# Patient Record
Sex: FEMALE | Race: WHITE | NOT HISPANIC OR LATINO | Employment: OTHER | URBAN - METROPOLITAN AREA
[De-identification: names, ages, dates, MRNs, and addresses within clinical notes are randomized per-mention and may not be internally consistent; named-entity substitution may affect disease eponyms.]

---

## 2017-03-06 LAB
EXT ALBUMIN: 4.2
EXT ALKALINE PHOSPHATASE: 86
EXT ALT: 14
EXT AST: 24
EXT BILIRUBIN TOTAL: 0.6
EXT BUN: 21
EXT CALCIUM: 9.5
EXT CHLORIDE: 101
EXT CHOLESTEROL: 157
EXT CO2: 25
EXT CREATININE: 1 MG/DL
EXT EOSINOPHIL%: 1.6
EXT FERRITIN: 226
EXT FOLATE: 9.04
EXT GFR MDRD NON AF AMER: 57.2
EXT GGT: 60
EXT GLUCOSE: 110
EXT HCV QUANT: ABNORMAL
EXT HDL: 42
EXT HEMATOCRIT: 37.1
EXT HEMOGLOBIN: 12.2
EXT LYMPH%: 41.9
EXT MONOCYTES%: 8.3
EXT PLATELETS: 211
EXT POTASSIUM: 4
EXT PROTEIN TOTAL: 7.6
EXT RETICULOCYTE COUNT: 1.4
EXT SEGS%: 47.6
EXT SIROLIMUS LVL: 6.1
EXT SODIUM: 141 MMOL/L
EXT TIBC: 243
EXT TRIGLYCERIDES: 256
EXT UNSATURATED IRON BINDING CAP.: 151
EXT WBC: 5
IRON SERPL-MCNC: 92 UG/DL
LDH, TOTAL: 179
TSH SERPL DL<=0.005 MIU/L-ACNC: 3.38 UIU/ML (ref 0.41–5.9)
VITAMIN B12: 423

## 2017-03-09 ENCOUNTER — TELEPHONE (OUTPATIENT)
Dept: TRANSPLANT | Facility: CLINIC | Age: 72
End: 2017-03-09

## 2017-03-09 NOTE — TELEPHONE ENCOUNTER
----- Message from Melanie Gifford MD sent at 3/7/2017 12:43 PM CST -----  The Labs are stable - please let patient know.

## 2017-03-10 ENCOUNTER — TELEPHONE (OUTPATIENT)
Dept: TRANSPLANT | Facility: CLINIC | Age: 72
End: 2017-03-10

## 2017-03-10 NOTE — TELEPHONE ENCOUNTER
----- Message from Melanie Gifford MD sent at 3/10/2017 11:56 AM CST -----  The Labs are stable - please let patient know.

## 2017-03-13 RX ORDER — SIROLIMUS 1 MG/1
1 TABLET, FILM COATED ORAL DAILY
Qty: 90 TABLET | Refills: 3 | Status: SHIPPED | OUTPATIENT
Start: 2017-03-13 | End: 2017-06-05 | Stop reason: SDUPTHER

## 2017-06-05 ENCOUNTER — TELEPHONE (OUTPATIENT)
Dept: TRANSPLANT | Facility: CLINIC | Age: 72
End: 2017-06-05

## 2017-06-05 LAB
EXT ALBUMIN: 3.6
EXT ALKALINE PHOSPHATASE: 80
EXT ALT: 11
EXT AST: 19
EXT BILIRUBIN TOTAL: 0.4
EXT BUN: 16
EXT CALCIUM: 9.5
EXT CHLORIDE: 103
EXT CHOLESTEROL: 148
EXT CO2: 28
EXT CREATININE: 0.8 MG/DL
EXT EOSINOPHIL%: 5.2
EXT GFR MDRD NON AF AMER: 70
EXT GGT: 49
EXT GLUCOSE: 116
EXT HDL: 32
EXT HEMATOCRIT: 35.2
EXT HEMOGLOBIN: 11.4
EXT LDL CHOLESTEROL: 54
EXT LYMPH%: 37.5
EXT MONOCYTES%: 10.8
EXT PLATELETS: 202
EXT POTASSIUM: 4
EXT PROTEIN TOTAL: 6.9
EXT SEGS%: 45.7
EXT SIROLIMUS LVL: 7.1
EXT SODIUM: 144 MMOL/L
EXT TRIGLYCERIDES: 308
EXT WBC: 5.4

## 2017-06-05 RX ORDER — SIROLIMUS 1 MG/1
1 TABLET, FILM COATED ORAL DAILY
Qty: 90 TABLET | Refills: 3 | Status: SHIPPED | OUTPATIENT
Start: 2017-06-05 | End: 2018-03-05 | Stop reason: SDUPTHER

## 2017-06-05 NOTE — TELEPHONE ENCOUNTER
Patient notified and instructed that refill request has been sent to NP for review and signature and to be electronically sent to her pharmacy. She was able to voice understanding.

## 2017-06-05 NOTE — TELEPHONE ENCOUNTER
----- Message from Megan Ag NP sent at 6/5/2017  2:24 PM CDT -----  Rapamune refill complete  Patient needs annual f/u scheduled last seen 7/2016    thx

## 2017-06-05 NOTE — TELEPHONE ENCOUNTER
----- Message from Amelia Hsieh sent at 6/5/2017 12:41 PM CDT -----  Contact: patient   Calling to get a refill on her sirolimus (RAPAMUNE) 1 MG Tab called into [facility] [place]. Please call   [phone]

## 2017-06-08 ENCOUNTER — TELEPHONE (OUTPATIENT)
Dept: TRANSPLANT | Facility: CLINIC | Age: 72
End: 2017-06-08

## 2017-06-08 NOTE — TELEPHONE ENCOUNTER
----- Message from Melanie Gifford MD sent at 6/8/2017 12:34 PM CDT -----  The Labs are stable - please let patient know.

## 2017-09-22 ENCOUNTER — TELEPHONE (OUTPATIENT)
Dept: TRANSPLANT | Facility: CLINIC | Age: 72
End: 2017-09-22

## 2017-09-22 NOTE — TELEPHONE ENCOUNTER
Labs were due on 9/11/17; her local lab reported she did not go.  Missed Lab Letter was sent to patient.

## 2017-10-02 LAB
EXT ALBUMIN: 4.2
EXT ALKALINE PHOSPHATASE: 80
EXT ALT: 13
EXT AST: 22
EXT BILIRUBIN TOTAL: 0.5
EXT BUN: 13
EXT CALCIUM: 9.2
EXT CHLORIDE: 103
EXT CHOLESTEROL: 145
EXT CO2: 26
EXT CREATININE: 0.9 MG/DL
EXT EOSINOPHIL%: 2.7
EXT GFR MDRD NON AF AMER: 66
EXT GGT: 50
EXT GLUCOSE: 102
EXT HDL: 41
EXT HEMATOCRIT: 36
EXT HEMOGLOBIN: 12
EXT LDL CHOLESTEROL: 72
EXT LYMPH%: 37.7
EXT MONOCYTES%: 9.9
EXT PLATELETS: 191
EXT POTASSIUM: 4.2
EXT PROTEIN TOTAL: 7.6
EXT SEGS%: 48.9
EXT SIROLIMUS LVL: 4.3
EXT SODIUM: 143 MMOL/L
EXT TRIGLYCERIDES: 161
EXT WBC: 5

## 2017-10-09 ENCOUNTER — TELEPHONE (OUTPATIENT)
Dept: TRANSPLANT | Facility: CLINIC | Age: 72
End: 2017-10-09

## 2017-10-09 NOTE — TELEPHONE ENCOUNTER
----- Message from Melanie Gifford MD sent at 10/8/2017  8:55 PM CDT -----  The Labs are stable - please let patient know.

## 2018-01-15 LAB
EXT ALBUMIN: 4.5
EXT ALKALINE PHOSPHATASE: 93
EXT ALT: 16
EXT AST: 26
EXT BILIRUBIN TOTAL: 0.6
EXT BUN: 16
EXT CALCIUM: 9.5
EXT CHLORIDE: 106
EXT CHOLESTEROL: 165
EXT CO2: 30
EXT CREATININE: 0.7 MG/DL
EXT EOSINOPHIL%: 1.5
EXT GFR MDRD NON AF AMER: 84
EXT GGT: 60
EXT GLUCOSE: 101
EXT HDL: 45
EXT HEMATOCRIT: 40
EXT HEMOGLOBIN: 13
EXT LDL CHOLESTEROL: 77.4
EXT LYMPH%: 35.1
EXT MONOCYTES%: 9.7
EXT PLATELETS: 225
EXT POTASSIUM: 4.3
EXT PROTEIN TOTAL: 7.9
EXT SEGS%: 52.7
EXT SIROLIMUS LVL: 4.8
EXT SODIUM: 145 MMOL/L
EXT TRIGLYCERIDES: 213
EXT WBC: 5.2

## 2018-01-25 ENCOUNTER — TELEPHONE (OUTPATIENT)
Dept: TRANSPLANT | Facility: CLINIC | Age: 73
End: 2018-01-25

## 2018-01-25 NOTE — TELEPHONE ENCOUNTER
----- Message from Melanie Gifford MD sent at 1/19/2018 10:06 PM CST -----  The Labs are stable - please let patient know.

## 2018-02-16 ENCOUNTER — TELEPHONE (OUTPATIENT)
Dept: TRANSPLANT | Facility: CLINIC | Age: 73
End: 2018-02-16

## 2018-02-16 NOTE — TELEPHONE ENCOUNTER
----- Message from Phyllis Schmitt sent at 2/16/2018 11:19 AM CST -----  Contact: Pt  Would like a call regarding medication she was prescribed for her cold.     She would like to know if she is able to take it?     call [phone]

## 2018-02-16 NOTE — TELEPHONE ENCOUNTER
Pt called about taking naproxen for back pain advised she should not take that as it is an Nsaid. Advised it was ok to take Robaxin that was given as well. She also has upper resp infection as was given a week of steriods    Advised ok to take for the week

## 2018-03-05 RX ORDER — SIROLIMUS 1 MG/1
1 TABLET, FILM COATED ORAL DAILY
Qty: 90 TABLET | Refills: 3 | Status: SHIPPED | OUTPATIENT
Start: 2018-03-05 | End: 2018-11-19 | Stop reason: SDUPTHER

## 2018-04-20 ENCOUNTER — TELEPHONE (OUTPATIENT)
Dept: TRANSPLANT | Facility: CLINIC | Age: 73
End: 2018-04-20

## 2018-04-20 NOTE — TELEPHONE ENCOUNTER
Labs were due on 4/16/18; informed by  that patient did not go. Missed Lab Letter sent to patient.

## 2018-04-24 LAB
EXT ALBUMIN: 4.2
EXT ALKALINE PHOSPHATASE: 73
EXT ALT: 10
EXT AST: 16
EXT BILIRUBIN TOTAL: 0.6
EXT BUN: 23
EXT CALCIUM: 10
EXT CHLORIDE: 101
EXT CO2: 29
EXT CREATININE: 0.8 MG/DL
EXT EOSINOPHIL%: 1.5
EXT GFR MDRD NON AF AMER: 72
EXT GGT: 24
EXT GLUCOSE: 88
EXT HEMATOCRIT: 38.3
EXT HEMOGLOBIN: 12.1
EXT LYMPH%: 38.4
EXT MONOCYTES%: 8.1
EXT PLATELETS: 239
EXT POTASSIUM: 4.1
EXT PROTEIN TOTAL: 8
EXT RAPAMUNE LEVEL: 4.7
EXT SEGS%: 51.2
EXT SODIUM: 142 MMOL/L
EXT WBC: 5.2

## 2018-05-02 ENCOUNTER — TELEPHONE (OUTPATIENT)
Dept: TRANSPLANT | Facility: CLINIC | Age: 73
End: 2018-05-02

## 2018-05-02 NOTE — TELEPHONE ENCOUNTER
----- Message from Melanie Gifford MD sent at 4/28/2018  1:16 PM CDT -----  The Labs are stable - please let patient know.

## 2018-07-18 ENCOUNTER — TELEPHONE (OUTPATIENT)
Dept: TRANSPLANT | Facility: CLINIC | Age: 73
End: 2018-07-18

## 2018-07-18 NOTE — TELEPHONE ENCOUNTER
----- Message from Dona Catalan sent at 7/18/2018  3:07 PM CDT -----  Done.  I cx'd her appt per her msg request     ----- Message -----  From: Angela Randall  Sent: 7/18/2018  11:32 AM  To: [facility] Post-Liver Transplant Non-Clinical    Patient Requesting Sooner Appointment.     Reason for sooner appt.:  When is the first available appointment?  Communication Preference:  Additional Information: wants to cancel appt and she will call back to resched

## 2018-08-14 ENCOUNTER — TELEPHONE (OUTPATIENT)
Dept: TRANSPLANT | Facility: CLINIC | Age: 73
End: 2018-08-14

## 2018-08-14 LAB
EXT ALBUMIN: 4.3
EXT ALKALINE PHOSPHATASE: 75
EXT ALT: 14
EXT AST: 18
EXT BILIRUBIN TOTAL: 0.5
EXT BUN: 18
EXT CALCIUM: 9.8
EXT CHLORIDE: 104
EXT CHOLESTEROL: 182
EXT CO2: 27
EXT CREATININE: 0.88 MG/DL
EXT EOSINOPHIL%: 1.5
EXT GFR MDRD NON AF AMER: 63
EXT GGT: 35
EXT GLUCOSE: 97
EXT HDL: 50
EXT HEMATOCRIT: 38.3
EXT HEMOGLOBIN: 12.2
EXT LDL CHOLESTEROL: 103
EXT LYMPH%: 36.3
EXT MONOCYTES%: 10.3
EXT PLATELETS: 218
EXT POTASSIUM: 4.7
EXT PROTEIN TOTAL: 7.7
EXT SEGS%: 51.3
EXT SIROLIMUS LVL: 5.1
EXT SODIUM: 144 MMOL/L
EXT TRIGLYCERIDES: 145
EXT WBC: 4.7

## 2018-08-14 NOTE — TELEPHONE ENCOUNTER
Labs due on 7/30/18.  Unable to get results from local lab as requested.  Missed Lab Letter sent to patient.

## 2018-08-20 ENCOUNTER — TELEPHONE (OUTPATIENT)
Dept: TRANSPLANT | Facility: CLINIC | Age: 73
End: 2018-08-20

## 2018-08-20 NOTE — TELEPHONE ENCOUNTER
----- Message from Dona Catalan sent at 8/20/2018  3:52 PM CDT -----  Contact: patient  I left her a voicemail to call me back    ----- Message -----  From: Nellie Rai  Sent: 8/20/2018   3:14 PM  To: [facility] Post-Liver Transplant Non-Clinical    Patient Requesting Appointment.     Reason for appt.: schedule annual appoinment  When is the first available appointment? n/a  Communication Preference: [phone]  Additional Information: n/a

## 2018-08-20 NOTE — TELEPHONE ENCOUNTER
----- Message from Melanie Gifford MD sent at 8/19/2018 10:16 AM CDT -----  The Labs are stable - please let patient know.

## 2018-08-23 ENCOUNTER — TELEPHONE (OUTPATIENT)
Dept: GASTROENTEROLOGY | Facility: CLINIC | Age: 73
End: 2018-08-23

## 2018-08-23 NOTE — TELEPHONE ENCOUNTER
Spoke with patient. Asked her to have  fax over her records, she verbalized understanding, JOSLYN.

## 2018-08-23 NOTE — TELEPHONE ENCOUNTER
----- Message from Abdi Zapien sent at 8/23/2018 12:12 PM CDT -----  Contact: pt   Pt is requesting a call back from the nurse in regards to getting in to see the Dr because Dr Bryan in [place]  wants her to get in to see Dr. Lopes. I don't have any slots to put the pt in  [phone]

## 2018-10-09 ENCOUNTER — TELEPHONE (OUTPATIENT)
Dept: TRANSPLANT | Facility: CLINIC | Age: 73
End: 2018-10-09

## 2018-10-09 NOTE — TELEPHONE ENCOUNTER
----- Message from Dona Catalan sent at 10/9/2018  1:27 PM CDT -----  Contact: Patient   Done, I spoke to Mrs. Bates and rescheduled her appt to 11/19/18    ----- Message -----  From: June Aguilar  Sent: 10/9/2018   1:08 PM  To: [facility] Post-Liver Transplant Non-Clinical    Needs Advice    Reason for call: pt called to reschedule 10/8 appt. Pt prefer a Monday noon, if possible.        Communication Preference: [phone]    Additional Information: This is patient 3rd attempt calling to schedule appt.

## 2018-11-19 ENCOUNTER — OFFICE VISIT (OUTPATIENT)
Dept: TRANSPLANT | Facility: CLINIC | Age: 73
End: 2018-11-19
Payer: MEDICARE

## 2018-11-19 VITALS
HEART RATE: 60 BPM | OXYGEN SATURATION: 100 % | SYSTOLIC BLOOD PRESSURE: 115 MMHG | WEIGHT: 158.94 LBS | TEMPERATURE: 98 F | RESPIRATION RATE: 16 BRPM | DIASTOLIC BLOOD PRESSURE: 91 MMHG | HEIGHT: 66 IN | BODY MASS INDEX: 25.54 KG/M2

## 2018-11-19 DIAGNOSIS — I10 ESSENTIAL HYPERTENSION: ICD-10-CM

## 2018-11-19 DIAGNOSIS — Z29.89 NEED FOR PROPHYLACTIC IMMUNOTHERAPY: ICD-10-CM

## 2018-11-19 DIAGNOSIS — Z94.4 STATUS POST LIVER TRANSPLANT: Primary | ICD-10-CM

## 2018-11-19 DIAGNOSIS — M85.80 OSTEOPENIA, UNSPECIFIED LOCATION: ICD-10-CM

## 2018-11-19 DIAGNOSIS — Z85.828 HISTORY OF SKIN CANCER: ICD-10-CM

## 2018-11-19 PROCEDURE — 99214 OFFICE O/P EST MOD 30 MIN: CPT | Mod: PBBFAC | Performed by: NURSE PRACTITIONER

## 2018-11-19 PROCEDURE — 99215 OFFICE O/P EST HI 40 MIN: CPT | Mod: S$PBB,,, | Performed by: NURSE PRACTITIONER

## 2018-11-19 PROCEDURE — 99999 PR PBB SHADOW E&M-EST. PATIENT-LVL IV: CPT | Mod: PBBFAC,,, | Performed by: NURSE PRACTITIONER

## 2018-11-19 RX ORDER — SIROLIMUS 1 MG/1
1 TABLET, SUGAR COATED ORAL DAILY
Qty: 90 TABLET | Refills: 3 | Status: SHIPPED | OUTPATIENT
Start: 2018-11-19 | End: 2018-12-03 | Stop reason: SDUPTHER

## 2018-11-19 NOTE — PATIENT INSTRUCTIONS
1. Continue your labs every 3 months, follow up in clinic at least yearly   2. Continue dermatology follow up for your skin exam   3. Contact previous vascular doctor to inquire about diagnosis and notify coordinator so can schedule appt in vascular surgery/medicine   4. If you have any questions, call the [facility] mail order pharmacy [phone]  5. Repeat your bone scan (DEXA) scan at your primary care office     EDUCATION:  -- You have an increased risk of infection (bacterial, viral, or fungal infections) because you are on immunosuppression medications  -- schedule colonoscopy, and/or well women screenings as recommended by your primary care provider   -- Higher chance of high blood pressure, diabetes, high cholesterol and weight gain post transplant, so recommend to follow closely with your primary doctor to monitor for these conditions or worsening of these conditions. At higher risk for diabetes with rejection medications post transplant so should be screened for diabetes at least yearly by your primary provider  -- increased risk of cancer, including skin cancers. Avoid sun, wears hat when outside, use sunscreen with at least 15 spf at all times when outside, schedule yearly dermatology skin checks (at higher risk for skin cancer post transplant)  -- increased risk of kidney disease with the use of immunosuppression medications  -- DXA (bone) scan to assess for bone thinning (osteoporosis), next due now

## 2018-11-19 NOTE — PROGRESS NOTES
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    ORGAN: LIVER  Whole or Partial: whole liver  Donor Type:   CDC High Risk:   Donor CMV Status: Positive  Donor HCV Status:   Donor HBcAb:   Donor HBV ANTHONY:  Donor HCV ANTHONY:   Biliary Anastomosis:   Arterial Anatomy:   IVC reconstruction:   Portal vein status:     She has had the following complications since transplant: h/o skin cancer. The noted complications are well controlled.    Subjective:     Interval History: Sheri Leon was last seen on 7/2016 by NARGIS Corbin NP.  Needs f/u visit with Dr. Gifford.    Currently, she is doing well. Current complaints include none.    Hepatologist: Dr. Gifford    Current Immunosuppression: currently taking Rapamune 1 mg daily, level 5.1 8/2018     Liver allograft function: excellent      8/13/2018 00:00   EXT Albumin 4.3   EXT Alkaline Phosphatase 75   EXT ALT 14   EXT AST 18   EXT BilirubiN Total 0.5   EXT Creatinine 0.88   EXT Glucose 97   EXT Platelets 218   EXT Potassium 4.7   EXT Sirolimus Lvl 5.1   EXT Sodium 144   EXT WBC 4.7       Kidney function : WNL    No recurrent infections, any hospitalizations since last visit   Skin cancer followed by local derm, last excision per pt report early 2018, dermatology notified pt "not worrisome"  HTN - well controlled     Last Fibroscan : none, will offer today     HEALTH MAINTENANCE:  1. Last DXA scan: osteopenia in past, needs repeat   2. Last dermatology skin assessment - followed by local derm   3. A1c - per PCP   4. Mammogram - overdue, pt to schedule     Review of Systems   Constitutional: Negative for activity change, appetite change, chills, fatigue, fever and unexpected weight change.   HENT: Negative.    Eyes: Negative.    Respiratory: Negative for cough and shortness of breath.    Cardiovascular: Negative for " chest pain and leg swelling.   Gastrointestinal: Negative for abdominal distention, abdominal pain, constipation, diarrhea, nausea and vomiting.   Endocrine: Negative.    Genitourinary: Negative for dysuria, flank pain and urgency.   Musculoskeletal: Negative.    Skin: Negative for rash and wound.   Allergic/Immunologic: Positive for immunocompromised state.   Neurological: Negative for dizziness, weakness and headaches.   Hematological: Negative for adenopathy. Does not bruise/bleed easily.   Psychiatric/Behavioral: Negative.        Objective:     Physical Exam   Constitutional: She is oriented to person, place, and time. She appears well-developed and well-nourished.   HENT:   Head: Normocephalic and atraumatic.   Eyes: EOM are normal. Pupils are equal, round, and reactive to light. No scleral icterus.   Neck: Normal range of motion. Neck supple.   Cardiovascular: Normal rate, regular rhythm and normal heart sounds.   No murmur heard.  Pulmonary/Chest: Effort normal and breath sounds normal. She has no wheezes. She has no rales.   Abdominal: Soft. Bowel sounds are normal. She exhibits no distension and no mass. There is no tenderness. There is no rebound and no guarding.   Musculoskeletal: Normal range of motion. She exhibits no edema or tenderness.   Lymphadenopathy:     She has no cervical adenopathy.   Neurological: She is alert and oriented to person, place, and time.   Skin: Skin is warm and dry. Capillary refill takes less than 2 seconds. No rash noted. No erythema.   Psychiatric: She has a normal mood and affect. Her behavior is normal.     Lab Results   Component Value Date    BILITOT 0.9 08/04/2014    AST 26 08/04/2014    ALT 18 08/04/2014    ALKPHOS 90 08/04/2014    CREATININE 1.0 08/04/2014    ALBUMIN 3.8 08/04/2014     Lab Results   Component Value Date    WBC 4.62 08/04/2014    HGB 12.7 08/04/2014    HCT 39.6 08/04/2014     08/04/2014     Lab Results   Component Value Date    SIROLIMUSLEV 6.6  08/04/2014       Assessment/Plan:     1. Status post liver transplant    2. Need for prophylactic immunotherapy    3. Osteopenia, unspecified location    4. Essential hypertension    5. History of skin cancer        1. S/p liver transplant 1993, cryptogenic     2. Immunosuppression - currently taking rapamune 1 mg daily, last level 5.1 8/2018  --- immunosuppression stable, kidney function stable    3. Health maintenance  -- yearly dermatology visit - UTD with local provider   -- DXA scan : due now, pt to organize with local PCP  -- Diabetes screening with A1c per PCP  -- Fibroscan yearly to offer today      4. HTN  -- controlled     5. Osteopenia  -- needs repeat DEXA, pt to arrange with local PCP    6. H/o skin cancer  -- pt followed closely by derm locally     7. Pt report of vascular abnormality in legs (?)  -- pt unsure of specifics of diagnosis, wishes for 2nd opinion, offered vascular consult at [facility]. Pt will obtain details of diagnosis and call to schedule appt       PLAN:  1. RTC in 1 year with Dr. Gifford, labs per protocol   2. Fibroscan yearly starting next year (not fasting today)  3. Pt inquiring about vascular consult for 2nd opinion. Pt unsure of details of diagnosis, reports she was diagnosed with some vascular abnormalities in her BLE but unsure of details. Instructed pt to obtain detail of diagnosis and notify coordinator of details so that appropriate provider in vascular can be scheduled   4. Continue f/u with local dermatology   5. needs repeat DEXA, pt to arrange with local PCP  6. [facility] [phone] with any questions about Rapamune. Pt will start filling med with [facility] mail order pharmacy, rx sent     Note routed to post-liver transplant coordinator pool, Dr. Ирина Doe, EVERTON CHAVEZOS Patient Status  Functional Status: 100% - Normal, no complaints, no evidence of disease  Physical Capacity: No Limitations

## 2018-11-19 NOTE — LETTER
November 19, 2018        Donovan Pabon Jr.  [address]  [place] [address]  Phone: [phone]  Fax: [phone]             [facility]  [address]  [facility] [address]  Phone: [phone]   Patient: Sheri Bates   MR Number: [id number]   YOB: 1945   Date of Visit: 11/19/2018       Dear Dr. Donovan Pabon Jr.    Thank you for referring Sheri Bates to me for evaluation. Attached you will find relevant portions of my assessment and plan of care.    If you have questions, please do not hesitate to call me. I look forward to following Sheri Bates along with you.    Sincerely,    Ivonne Doe NP    Enclosure    If you would like to receive this communication electronically, please contact [email or web address] or [phone] to request [employer] access.    [employer] is a tool which provides read-only access to select patient information with whom you have a relationship. Its easy to use and provides real time access to review your patients record including encounter summaries, notes, results, and demographic information.    If you feel you have received this communication in error or would no longer like to receive these types of communications, please e-mail [email or web address]

## 2018-11-30 ENCOUNTER — OFFICE VISIT (OUTPATIENT)
Dept: GASTROENTEROLOGY | Facility: CLINIC | Age: 73
End: 2018-11-30
Payer: MEDICARE

## 2018-11-30 VITALS
DIASTOLIC BLOOD PRESSURE: 80 MMHG | HEART RATE: 60 BPM | HEIGHT: 66 IN | SYSTOLIC BLOOD PRESSURE: 140 MMHG | BODY MASS INDEX: 25.33 KG/M2 | WEIGHT: 157.63 LBS

## 2018-11-30 DIAGNOSIS — Z94.4 LIVER TRANSPLANTED: ICD-10-CM

## 2018-11-30 DIAGNOSIS — D84.9 IMMUNOSUPPRESSION: Primary | ICD-10-CM

## 2018-11-30 PROCEDURE — 99213 OFFICE O/P EST LOW 20 MIN: CPT | Mod: S$PBB,,, | Performed by: INTERNAL MEDICINE

## 2018-11-30 PROCEDURE — 99999 PR PBB SHADOW E&M-EST. PATIENT-LVL III: CPT | Mod: PBBFAC,,, | Performed by: INTERNAL MEDICINE

## 2018-11-30 PROCEDURE — 99213 OFFICE O/P EST LOW 20 MIN: CPT | Mod: PBBFAC,PO | Performed by: INTERNAL MEDICINE

## 2018-11-30 NOTE — PROGRESS NOTES
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    ORGAN: LIVER  Whole or Partial: whole liver      Subjective:     Interval History:  Currently, she is doing well. Current complaints include chronic issues which she believes is lower extremity edema that has been present for many years.  She believes related to since after her transplant.  Otherwise she wishes seen in transplant clinic about 2 weeks ago.  She was unaware that this visit was also liver related.  She does follow up with her doctors regularly including a cardiologist as well as dermatologist locally.  She has not gotten a mammogram done and expresses that she is not interested in following up with her mammogram.    She has not had any issues with her liver since transplant.    She believes she had non a non-B hepatitis although she did have hepatitis-C quant checked in 2017 which was nonreactive.  She denies any treatment for hepatitis C in the past.    Review of Systems    Objective:     Physical Exam   Constitutional: She is oriented to person, place, and time. She appears well-developed and well-nourished. No distress.   HENT:   Head: Normocephalic and atraumatic.   Mouth/Throat: Oropharynx is clear and moist. No oropharyngeal exudate.   Eyes: Conjunctivae are normal. Pupils are equal, round, and reactive to light. Right eye exhibits no discharge. Left eye exhibits no discharge. No scleral icterus.   Pulmonary/Chest: Effort normal and breath sounds normal. No respiratory distress. She has no wheezes.   Abdominal: Soft. She exhibits no distension. There is no tenderness.   Musculoskeletal: She exhibits no edema.   Neurological: She is alert and oriented to person, place, and time.   Psychiatric: She has a normal mood and affect. Her behavior is normal.   Vitals reviewed.      WBC   Date  Value Ref Range Status   08/04/2014 4.62 3.90 - 12.70 K/uL Final     Hemoglobin   Date Value Ref Range Status   08/04/2014 12.7 12.0 - 16.0 g/dL Final     Hematocrit   Date Value Ref Range Status   08/04/2014 39.6 37.0 - 48.5 % Final     Platelets   Date Value Ref Range Status   08/04/2014 217 150 - 350 K/uL Final     BUN, Bld   Date Value Ref Range Status   08/04/2014 14 8 - 23 mg/dL Final     Creatinine   Date Value Ref Range Status   08/04/2014 1.0 0.5 - 1.4 mg/dL Final     Glucose   Date Value Ref Range Status   08/04/2014 96 70 - 110 mg/dL Final     Calcium   Date Value Ref Range Status   08/04/2014 9.2 8.7 - 10.5 mg/dL Final     Sodium   Date Value Ref Range Status   08/04/2014 143 136 - 145 mmol/L Final     Potassium   Date Value Ref Range Status   08/04/2014 3.5 3.5 - 5.1 mmol/L Final     Chloride   Date Value Ref Range Status   08/04/2014 108 95 - 110 mmol/L Final     AST   Date Value Ref Range Status   08/04/2014 26 10 - 40 U/L Final     ALT   Date Value Ref Range Status   08/04/2014 18 10 - 44 U/L Final     Alkaline Phosphatase   Date Value Ref Range Status   08/04/2014 90 55 - 135 U/L Final     Total Bilirubin   Date Value Ref Range Status   08/04/2014 0.9 0.1 - 1.0 mg/dL Final     Comment:     For infants and newborns, interpretation of results should be based  on gestational age, weight and in agreement with clinical  observations.  Premature Infant recommended reference ranges:  Up to 24 hours.............<8.0 mg/dL  Up to 48 hours............<12.0 mg/dL  3-5 days..................<15.0 mg/dL  6-29 days.................<15.0 mg/dL       Albumin   Date Value Ref Range Status   08/04/2014 3.8 3.5 - 5.2 g/dL Final     No results found for: INR  Lab Results   Component Value Date    SIROLIMUSLEV 6.6 08/04/2014           Assessment/Plan:     1. Immunosuppression    2. Liver transplanted      Immunosuppression  -continue current immunosuppression    Liver transplanted-good allograft function  -at next visit  will consider fibroscan, although little concern for recurrent liver disease  -continue with routine labs, add on HCV Ab and quant  -Recommend she f/u with cards or PCP about her cholesterol    RTC in 1 year at [facility]    Patient was seen in the liver transplant department at [facility].    Angela Lopes MD         [facility] Patient Status  Functional Status: 80% - Normal activity with effort: some symptoms of disease  Physical Capacity: No Limitations

## 2018-12-02 ENCOUNTER — PATIENT MESSAGE (OUTPATIENT)
Dept: TRANSPLANT | Facility: CLINIC | Age: 73
End: 2018-12-02

## 2018-12-03 DIAGNOSIS — Z94.4 STATUS POST LIVER TRANSPLANT: ICD-10-CM

## 2018-12-05 RX ORDER — SIROLIMUS 1 MG/1
1 TABLET, SUGAR COATED ORAL DAILY
Qty: 90 TABLET | Refills: 3 | Status: SHIPPED | OUTPATIENT
Start: 2018-12-05 | End: 2018-12-14 | Stop reason: SDUPTHER

## 2018-12-06 ENCOUNTER — PATIENT MESSAGE (OUTPATIENT)
Dept: TRANSPLANT | Facility: CLINIC | Age: 73
End: 2018-12-06

## 2018-12-07 ENCOUNTER — PATIENT MESSAGE (OUTPATIENT)
Dept: GASTROENTEROLOGY | Facility: CLINIC | Age: 73
End: 2018-12-07

## 2018-12-10 ENCOUNTER — PATIENT MESSAGE (OUTPATIENT)
Dept: GASTROENTEROLOGY | Facility: CLINIC | Age: 73
End: 2018-12-10

## 2018-12-13 ENCOUNTER — PATIENT MESSAGE (OUTPATIENT)
Dept: GASTROENTEROLOGY | Facility: CLINIC | Age: 73
End: 2018-12-13

## 2018-12-14 ENCOUNTER — PATIENT MESSAGE (OUTPATIENT)
Dept: GASTROENTEROLOGY | Facility: CLINIC | Age: 73
End: 2018-12-14

## 2018-12-14 DIAGNOSIS — Z94.4 STATUS POST LIVER TRANSPLANT: ICD-10-CM

## 2018-12-17 RX ORDER — SIROLIMUS 1 MG/1
1 TABLET, SUGAR COATED ORAL DAILY
Qty: 90 TABLET | Refills: 3 | Status: SHIPPED | OUTPATIENT
Start: 2018-12-17 | End: 2019-01-03 | Stop reason: SDUPTHER

## 2018-12-18 ENCOUNTER — PATIENT MESSAGE (OUTPATIENT)
Dept: TRANSPLANT | Facility: CLINIC | Age: 73
End: 2018-12-18

## 2018-12-18 ENCOUNTER — TELEPHONE (OUTPATIENT)
Dept: TRANSPLANT | Facility: CLINIC | Age: 73
End: 2018-12-18

## 2018-12-18 NOTE — TELEPHONE ENCOUNTER
Message sent to patient via [facility] asking her if she went to lab on 12/10/18 as scheduled because I did not get any results from her local lab when requested.

## 2018-12-20 LAB
EXT ALBUMIN: 4.3
EXT ALKALINE PHOSPHATASE: 80
EXT ALT: 15
EXT AST: 23
EXT BILIRUBIN TOTAL: 0.6
EXT BUN: 23
EXT CALCIUM: 10.1
EXT CHLORIDE: 109
EXT CHOLESTEROL: 169
EXT CO2: 30
EXT CREATININE: 0.8 MG/DL
EXT EOSINOPHIL%: 5.4
EXT GFR MDRD NON AF AMER: 69.5
EXT GGT: 40
EXT GLUCOSE: 100
EXT HDL: 42
EXT HEMATOCRIT: 34.7
EXT HEMOGLOBIN: 11.2
EXT LDL CHOLESTEROL: 100.6
EXT LYMPH%: 38
EXT MONOCYTES%: 10.2
EXT PLATELETS: 212
EXT POTASSIUM: 4.3
EXT PROTEIN TOTAL: 7.7
EXT SEGS%: 45.3
EXT SIROLIMUS LVL: 6.4
EXT SODIUM: 146 MMOL/L
EXT TRIGLYCERIDES: 132
EXT WBC: 3.9

## 2019-01-02 ENCOUNTER — TELEPHONE (OUTPATIENT)
Dept: TRANSPLANT | Facility: CLINIC | Age: 74
End: 2019-01-02

## 2019-01-03 ENCOUNTER — PATIENT MESSAGE (OUTPATIENT)
Dept: TRANSPLANT | Facility: CLINIC | Age: 74
End: 2019-01-03

## 2019-01-03 DIAGNOSIS — Z94.4 STATUS POST LIVER TRANSPLANT: ICD-10-CM

## 2019-01-06 RX ORDER — SIROLIMUS 1 MG/1
1 TABLET, SUGAR COATED ORAL DAILY
Qty: 90 TABLET | Refills: 3 | Status: SHIPPED | OUTPATIENT
Start: 2019-01-06 | End: 2019-01-10 | Stop reason: SDUPTHER

## 2019-01-10 ENCOUNTER — TELEPHONE (OUTPATIENT)
Dept: GASTROENTEROLOGY | Facility: CLINIC | Age: 74
End: 2019-01-10

## 2019-01-10 ENCOUNTER — PATIENT MESSAGE (OUTPATIENT)
Dept: TRANSPLANT | Facility: CLINIC | Age: 74
End: 2019-01-10

## 2019-01-10 DIAGNOSIS — Z94.4 STATUS POST LIVER TRANSPLANT: ICD-10-CM

## 2019-01-10 NOTE — TELEPHONE ENCOUNTER
----- Message from Keri Nichols sent at 1/10/2019 11:08 AM CST -----  Contact: Pt   Pt called and stated she has a questions about her Rapamune refill. She also stated that the medication has to be sent via mail order and not her local pharmacy. She also stated that she is almost out of the medication.  She can be reached at [phone].    Thanks,  TF

## 2019-01-10 NOTE — TELEPHONE ENCOUNTER
----- Message from Patrica Urrutia sent at 1/10/2019  2:34 PM CST -----  Contact: pt  Pt returning nurse call, please call pt [phone].

## 2019-01-14 RX ORDER — SIROLIMUS 1 MG/1
1 TABLET, FILM COATED ORAL DAILY
Qty: 90 TABLET | Refills: 3 | Status: SHIPPED | OUTPATIENT
Start: 2019-01-14 | End: 2020-01-03 | Stop reason: DRUGHIGH

## 2019-02-05 ENCOUNTER — PATIENT MESSAGE (OUTPATIENT)
Dept: TRANSPLANT | Facility: CLINIC | Age: 74
End: 2019-02-05

## 2019-03-12 ENCOUNTER — TELEPHONE (OUTPATIENT)
Dept: TRANSPLANT | Facility: CLINIC | Age: 74
End: 2019-03-12

## 2019-03-12 LAB
EXT ALBUMIN: 4.1
EXT ALKALINE PHOSPHATASE: 79
EXT ALT: 14
EXT AST: 17
EXT BILIRUBIN TOTAL: 0.55
EXT BUN: 13.2
EXT CALCIUM: 9.5
EXT CHLORIDE: 107
EXT CHOLESTEROL: 153
EXT CO2: 29
EXT CREATININE: 0.9 MG/DL
EXT EOSINOPHIL%: 1.6
EXT GFR MDRD NON AF AMER: 62
EXT GGT: 40
EXT GLUCOSE: 118
EXT HDL: 47
EXT HEMATOCRIT: 38
EXT HEMOGLOBIN: 12
EXT LDL CHOLESTEROL: 83.2
EXT LYMPH%: 40.7
EXT MONOCYTES%: 9.6
EXT PLATELETS: 199
EXT POTASSIUM: 4.3
EXT PROTEIN TOTAL: 7.5
EXT SEGS%: 47.5
EXT SIROLIMUS LVL: 4.5
EXT SODIUM: 146 MMOL/L
EXT TRIGLYCERIDES: 114
EXT WBC: 5

## 2019-03-12 NOTE — TELEPHONE ENCOUNTER
----- Message from Rita Platt sent at 3/12/2019  8:06 AM CDT -----  Contact: Crystal/Pathology Lab  Please call Crystal at [phone]    Fax# [phone] or [phone]    Standing lab orders are needed immediately    Patient is currently at the office for 20 mins    Thank you

## 2019-03-20 ENCOUNTER — TELEPHONE (OUTPATIENT)
Dept: TRANSPLANT | Facility: CLINIC | Age: 74
End: 2019-03-20

## 2019-03-20 NOTE — TELEPHONE ENCOUNTER
----- Message from Angela Lopes MD sent at 3/20/2019 10:51 AM CDT -----  Reviewed, nothing to do; repeat per routine

## 2019-04-23 ENCOUNTER — PATIENT MESSAGE (OUTPATIENT)
Dept: TRANSPLANT | Facility: CLINIC | Age: 74
End: 2019-04-23

## 2019-06-17 LAB
EXT ALBUMIN: 4.2
EXT ALKALINE PHOSPHATASE: 79
EXT ALT: 11
EXT AST: 18
EXT BILIRUBIN TOTAL: 0.5
EXT BUN: 11.4
EXT CALCIUM: 9.5
EXT CHLORIDE: 106
EXT CHOLESTEROL: 171
EXT CO2: 28
EXT CREATININE: 0.85 MG/DL
EXT EOSINOPHIL%: 2.4
EXT GFR MDRD NON AF AMER: 65.56
EXT GGT: 36
EXT GLUCOSE: 111
EXT HDL: 39
EXT HEMATOCRIT: 37.7
EXT HEMOGLOBIN: 12.1
EXT LDL CHOLESTEROL: 73.4
EXT LYMPH%: 35.7
EXT MONOCYTES%: 9.7
EXT PLATELETS: 224
EXT POTASSIUM: 3.5
EXT PROTEIN TOTAL: 7.3
EXT SEGS%: 51.4
EXT SIROLIMUS LVL: 7.1
EXT SODIUM: 144 MMOL/L
EXT TRIGLYCERIDES: 293
EXT WBC: 5.1

## 2019-07-08 ENCOUNTER — OFFICE VISIT (OUTPATIENT)
Dept: INTERNAL MEDICINE | Facility: CLINIC | Age: 74
End: 2019-07-08
Payer: MEDICARE

## 2019-07-08 VITALS
RESPIRATION RATE: 16 BRPM | SYSTOLIC BLOOD PRESSURE: 121 MMHG | DIASTOLIC BLOOD PRESSURE: 63 MMHG | BODY MASS INDEX: 27.49 KG/M2 | HEART RATE: 59 BPM | OXYGEN SATURATION: 98 % | HEIGHT: 65 IN | TEMPERATURE: 98 F | WEIGHT: 165 LBS

## 2019-07-08 DIAGNOSIS — Z23 NEED FOR STREPTOCOCCUS PNEUMONIAE VACCINATION: ICD-10-CM

## 2019-07-08 DIAGNOSIS — Z23 NEED FOR TDAP VACCINATION: ICD-10-CM

## 2019-07-08 DIAGNOSIS — M85.80 OSTEOPENIA, UNSPECIFIED LOCATION: ICD-10-CM

## 2019-07-08 DIAGNOSIS — Z23 NEED FOR SHINGLES VACCINE: ICD-10-CM

## 2019-07-08 DIAGNOSIS — I10 ESSENTIAL HYPERTENSION: Primary | ICD-10-CM

## 2019-07-08 DIAGNOSIS — Z94.4 STATUS POST LIVER TRANSPLANT: ICD-10-CM

## 2019-07-08 PROCEDURE — 99204 OFFICE O/P NEW MOD 45 MIN: CPT | Mod: ICN,CMP,S$GLB, | Performed by: INTERNAL MEDICINE

## 2019-07-08 PROCEDURE — 99204 PR OFFICE/OUTPT VISIT, NEW, LEVL IV, 45-59 MIN: ICD-10-PCS | Mod: ICN,CMP,S$GLB, | Performed by: INTERNAL MEDICINE

## 2019-07-08 RX ORDER — AMLODIPINE BESYLATE 5 MG/1
1 TABLET ORAL DAILY
Refills: 6 | COMMUNITY
Start: 2019-05-15 | End: 2019-10-17

## 2019-07-08 RX ORDER — LEVOTHYROXINE SODIUM 50 UG/1
1 TABLET ORAL DAILY
Refills: 3 | COMMUNITY
Start: 2019-05-22 | End: 2020-01-13

## 2019-07-08 NOTE — PROGRESS NOTES
Subjective:      Patient ID: Sheri Bates is a 74 y.o. female.    Chief Complaint: Establish Care and Leg Pain (Bilat leg pain...pt c/o Heriberto Horse)    HPI: Patient with h/o Liver transplant (12/05/1993) due to Hep C. Patient reports doing good and is being followed by [facility].     Patient was recently went to ER secondary to Bilateral leg pain and was concern about DVT as she has h/o vericoe veins. Pain was crampingin nature, intermittent, worsen with activity and better with rest.  Patient was thought to be dehydrated given IV fluids in ER and recommended to increase potassium consumption.  Patient reports symptoms are much improved and no more cramps noted.      Patient has h/o Migraine and is using propanolol for prophylaxis. These were really bad but the prophylactic medictions are helping and HA are improved in intensity and frequency,     Patient has Questionable history of HTN. Patient was started on Amlodipine but patient has not used the medicine x few weeks and BP seem under good control.  Patient has occasional ankle swelling and has bilateral compression stockings on    Review of Systems   Constitutional: Negative for chills, diaphoresis, fever, malaise/fatigue and weight loss.   HENT: Negative for congestion, ear pain, sinus pain, sore throat and tinnitus.    Eyes: Negative for blurred vision and photophobia.   Respiratory: Negative for cough, hemoptysis, shortness of breath and wheezing.    Cardiovascular: Negative for chest pain, palpitations, orthopnea, leg swelling and PND.   Gastrointestinal: Negative for abdominal pain, blood in stool, constipation, diarrhea, heartburn, melena, nausea and vomiting.   Genitourinary: Negative for dysuria, frequency and urgency.   Musculoskeletal: Negative for back pain, myalgias and neck pain.   Skin: Negative for rash.   Neurological: Negative for dizziness, tremors, seizures, loss of consciousness and weakness.   Endo/Heme/Allergies: Negative  for polydipsia.   Psychiatric/Behavioral: Negative for depression and hallucinations. The patient does not have insomnia.      Objective:     Physical Exam   Constitutional: She is oriented to person, place, and time. No distress.   Neck: No thyromegaly present.   Cardiovascular: Normal rate, regular rhythm and normal heart sounds.   No murmur heard.  Pulmonary/Chest: Effort normal and breath sounds normal. No respiratory distress. She has no wheezes. She exhibits no tenderness.   Abdominal: Soft. Bowel sounds are normal. She exhibits no distension. There is no tenderness.   Large healed surgical scar noted in sub coastal area bilaterally   Musculoskeletal: She exhibits no edema or tenderness.   Patient has bilateral compression stockings.   Lymphadenopathy:     She has no cervical adenopathy.   Neurological: She is alert and oriented to person, place, and time.   Skin: She is not diaphoretic.   Psychiatric: She has a normal mood and affect. Her behavior is normal. Judgment and thought content normal.     Assessment:     1. Essential hypertension    2. Status post liver transplant    3. Osteopenia, unspecified location       Plan:   Patient blood pressures are controlled though she has not taken the medication for last few weeks.   Will stop the medicine.   Patient has history of liver transplant due to hepatitis C.  Patient is being followed by [facility].  Patient bone density is done and followed by Yani Harrington  Will order Tdap + Shigrix and PNVR vaccine.

## 2019-08-14 ENCOUNTER — TELEPHONE (OUTPATIENT)
Dept: TRANSPLANT | Facility: CLINIC | Age: 74
End: 2019-08-14

## 2019-08-21 ENCOUNTER — OFFICE VISIT (OUTPATIENT)
Dept: INTERNAL MEDICINE | Facility: CLINIC | Age: 74
End: 2019-08-21
Payer: MEDICARE

## 2019-08-21 VITALS
OXYGEN SATURATION: 98 % | DIASTOLIC BLOOD PRESSURE: 71 MMHG | TEMPERATURE: 98 F | HEIGHT: 65 IN | BODY MASS INDEX: 27.49 KG/M2 | SYSTOLIC BLOOD PRESSURE: 126 MMHG | WEIGHT: 165 LBS | HEART RATE: 60 BPM | RESPIRATION RATE: 16 BRPM

## 2019-08-21 DIAGNOSIS — J06.9 UPPER RESPIRATORY TRACT INFECTION, UNSPECIFIED TYPE: Primary | ICD-10-CM

## 2019-08-21 DIAGNOSIS — Z23 NEED FOR STREPTOCOCCUS PNEUMONIAE VACCINATION: ICD-10-CM

## 2019-08-21 DIAGNOSIS — E87.6 HYPOKALEMIA: ICD-10-CM

## 2019-08-21 PROCEDURE — 99213 OFFICE O/P EST LOW 20 MIN: CPT | Mod: S$GLB,,, | Performed by: INTERNAL MEDICINE

## 2019-08-21 PROCEDURE — 99213 PR OFFICE/OUTPT VISIT, EST, LEVL III, 20-29 MIN: ICD-10-PCS | Mod: S$GLB,,, | Performed by: INTERNAL MEDICINE

## 2019-08-21 RX ORDER — LEVOFLOXACIN 500 MG/1
1 TABLET, FILM COATED ORAL DAILY
COMMUNITY
Start: 2019-08-19 | End: 2019-08-26

## 2019-08-21 RX ORDER — ONDANSETRON 4 MG/1
1 TABLET, FILM COATED ORAL DAILY
COMMUNITY
Start: 2019-08-19 | End: 2019-10-17

## 2019-08-21 RX ORDER — PROCHLORPERAZINE MALEATE 10 MG
1 TABLET ORAL DAILY
COMMUNITY
Start: 2019-08-19 | End: 2019-10-17

## 2019-08-21 RX ORDER — PANTOPRAZOLE SODIUM 40 MG/1
1 TABLET, DELAYED RELEASE ORAL 2 TIMES DAILY
Refills: 0 | COMMUNITY
Start: 2019-07-30 | End: 2022-09-07

## 2019-08-21 NOTE — PROGRESS NOTES
Subjective:      Patient ID: Sheri Bates is a 74 y.o. female.    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 Patient reports doing good and is being followed by [facility].     Patient was recently went to ER secondary to  URI symptoms and Abdominal pain.  Patient was treated with  Steroid +Levoquin and patient reports no more fever or chills, no mre runnyn ose, nasal congestion still has some mild cough. Patient was also having some abdominal pain and had a CT abdomen and that was Normal.     Patient has h/o Migraine and is using propanolol for prophylaxis. These were really bad but the prophylactic medictions are helping and HA are improved in intensity and frequency,     Patient was found to have Hypokalemia and was given 40 mEq of potassium In hospital repeat labs next day in another ER was Normal. Patient denies any muscle cramps, no constipation.     Review of Systems   Constitutional: Negative for chills, diaphoresis, fever, malaise/fatigue and weight loss.   HENT: Negative for congestion, ear pain, sinus pain, sore throat and tinnitus.    Eyes: Negative for blurred vision and photophobia.   Respiratory: Negative for cough, hemoptysis, shortness of breath and wheezing.    Cardiovascular: Negative for chest pain, palpitations, orthopnea, leg swelling and PND.   Gastrointestinal: Negative for abdominal pain, blood in stool, constipation, diarrhea, heartburn, melena, nausea and vomiting.   Genitourinary: Negative for dysuria, frequency and urgency.   Musculoskeletal: Negative for back pain, myalgias and neck pain.   Skin: Negative for rash.   Neurological: Negative for dizziness, tremors, seizures, loss of consciousness and weakness.   Endo/Heme/Allergies: Negative for polydipsia.   Psychiatric/Behavioral: Negative for depression and hallucinations. The patient does not have insomnia.       Objective:     Physical Exam   Constitutional: She is oriented to person, place, and time. No distress.   Neck: No thyromegaly present.   Cardiovascular: Normal rate, regular rhythm and normal heart sounds.   No murmur heard.  Pulmonary/Chest: Effort normal and breath sounds normal. No respiratory distress. She has no wheezes. She exhibits no tenderness.   Abdominal: Soft. Bowel sounds are normal. She exhibits no distension. There is no tenderness.   Large healed surgical scar noted in sub coastal area bilaterally   Musculoskeletal: She exhibits no edema or tenderness.   Patient has bilateral compression stockings.   Lymphadenopathy:     She has no cervical adenopathy.   Neurological: She is alert and oriented to person, place, and time.   Skin: She is not diaphoretic.   Psychiatric: She has a normal mood and affect. Her behavior is normal. Judgment and thought content normal.     Assessment:     1. Upper respiratory tract infection, unspecified type    2. Hypokalemia    3. Need for Streptococcus pneumoniae vaccination       Plan:   Patient was recently seen in ER secondary to URI symptoms.  Symptoms are improved after steroid injection and Levaquin  Advised patient to continue Levaquin for next few days  Full set of labs showed hypokalemia but next day ER visit showed potassium was normal.   Advised patient to use banana more frequently, no need of aggressive replacement at this time.   Will order Prevnar 13

## 2019-08-22 ENCOUNTER — TELEPHONE (OUTPATIENT)
Dept: INTERNAL MEDICINE | Facility: CLINIC | Age: 74
End: 2019-08-22

## 2019-08-22 NOTE — TELEPHONE ENCOUNTER
----- Message from Sowmya Eaton MD sent at 8/22/2019  8:10 AM CDT -----  Patient is on Singulair.. Please advise to take Flonase and Claritin OTC.     ----- Message -----  From: Annabella Farmer  Sent: 8/21/2019   3:42 PM  To: Sowmya Eaton MD    Patient states you offered her something for her congestion, but she declined.  She called back and states that she changed her mind and asks that you send something.

## 2019-08-26 ENCOUNTER — TELEPHONE (OUTPATIENT)
Dept: INTERNAL MEDICINE | Facility: CLINIC | Age: 74
End: 2019-08-26

## 2019-08-26 DIAGNOSIS — B37.31 CANDIDA VAGINITIS: Primary | ICD-10-CM

## 2019-08-26 RX ORDER — FLUCONAZOLE 150 MG/1
150 TABLET ORAL DAILY
Qty: 1 TABLET | Refills: 0 | Status: SHIPPED | OUTPATIENT
Start: 2019-08-26 | End: 2019-08-27

## 2019-08-26 NOTE — TELEPHONE ENCOUNTER
----- Message from Sowmya Eaton MD sent at 8/26/2019 11:54 AM CDT -----  medicationis refilled. Please advise not to take medicine with Levoquin    ----- Message -----  From: Annabella Farmer  Sent: 8/26/2019  11:40 AM  To: Sowmya Eaton MD    Patient states that prior to her appt with you last week she had gone to the ER and was prescribed antibiotics and now has a yeast infection.  She's asking if you'll rx her Diflucan to get rid of it.

## 2019-08-26 NOTE — PROGRESS NOTES
Patient reports having Candida vaginitis after taking Levaquin.   Will prescribe Diflucan.   Advised patient to avoid using Levaquin and Diflucan concomitantly.

## 2019-09-15 ENCOUNTER — PATIENT MESSAGE (OUTPATIENT)
Dept: TRANSPLANT | Facility: CLINIC | Age: 74
End: 2019-09-15

## 2019-09-30 LAB
EXT ALBUMIN: 4.3
EXT ALKALINE PHOSPHATASE: 69
EXT ALT: 11
EXT AST: 19
EXT BILIRUBIN TOTAL: 0.6
EXT BUN: 17
EXT CALCIUM: 9.7
EXT CHLORIDE: 106
EXT CHOLESTEROL: 177
EXT CO2: 28
EXT CREATININE: 0.9 MG/DL
EXT EOSINOPHIL%: 1.4
EXT GFR MDRD NON AF AMER: 62
EXT GGT: 30
EXT GLUCOSE: 105
EXT HDL: 38
EXT HEMATOCRIT: 36.7
EXT HEMOGLOBIN: 11.6
EXT LDL CHOLESTEROL: 101
EXT LYMPH%: 36.6
EXT MONOCYTES%: 9.2
EXT PLATELETS: 238
EXT POTASSIUM: 3.7
EXT PROTEIN TOTAL: 7.4
EXT SEGS%: 51.9
EXT SIROLIMUS LVL: 4.4
EXT SODIUM: 146 MMOL/L
EXT TRIGLYCERIDES: 191
EXT WBC: 4.3

## 2019-10-03 ENCOUNTER — TELEPHONE (OUTPATIENT)
Dept: TRANSPLANT | Facility: CLINIC | Age: 74
End: 2019-10-03

## 2019-10-08 ENCOUNTER — PATIENT MESSAGE (OUTPATIENT)
Dept: GASTROENTEROLOGY | Facility: CLINIC | Age: 74
End: 2019-10-08

## 2019-10-17 ENCOUNTER — OFFICE VISIT (OUTPATIENT)
Dept: INTERNAL MEDICINE | Facility: CLINIC | Age: 74
End: 2019-10-17
Payer: MEDICARE

## 2019-10-17 VITALS
HEIGHT: 65 IN | OXYGEN SATURATION: 99 % | SYSTOLIC BLOOD PRESSURE: 126 MMHG | TEMPERATURE: 98 F | DIASTOLIC BLOOD PRESSURE: 78 MMHG | BODY MASS INDEX: 27.43 KG/M2 | HEART RATE: 56 BPM | RESPIRATION RATE: 16 BRPM | WEIGHT: 164.63 LBS

## 2019-10-17 DIAGNOSIS — R63.1 POLYDIPSIA: ICD-10-CM

## 2019-10-17 DIAGNOSIS — R73.01 IMPAIRED FASTING BLOOD SUGAR: Primary | ICD-10-CM

## 2019-10-17 DIAGNOSIS — E03.9 HYPOTHYROIDISM, UNSPECIFIED TYPE: ICD-10-CM

## 2019-10-17 DIAGNOSIS — E78.1 HYPERTRIGLYCERIDEMIA: ICD-10-CM

## 2019-10-17 DIAGNOSIS — Z23 NEED FOR STREPTOCOCCUS PNEUMONIAE VACCINATION: ICD-10-CM

## 2019-10-17 DIAGNOSIS — Z23 NEED FOR TDAP VACCINATION: ICD-10-CM

## 2019-10-17 LAB
ESTIMATED AVERAGE GLUCOSE: 103 MG/DL
HBA1C MFR BLD: 5.2 % (ref 4–6)
T3FREE SERPL DIALY-MCNC: 2.51 PG/ML (ref 2–4.4)
T4, FREE: 1.46 NG/DL (ref 0.93–1.7)

## 2019-10-17 PROCEDURE — 99214 PR OFFICE/OUTPT VISIT, EST, LEVL IV, 30-39 MIN: ICD-10-PCS | Mod: S$GLB,,, | Performed by: INTERNAL MEDICINE

## 2019-10-17 PROCEDURE — 99214 OFFICE O/P EST MOD 30 MIN: CPT | Mod: S$GLB,,, | Performed by: INTERNAL MEDICINE

## 2019-10-17 RX ORDER — HYDROCODONE BITARTRATE AND ACETAMINOPHEN 5; 325 MG/1; MG/1
TABLET ORAL
Refills: 0 | COMMUNITY
Start: 2019-09-10 | End: 2019-10-17

## 2019-10-17 RX ORDER — SIMVASTATIN 10 MG/1
TABLET, FILM COATED ORAL
COMMUNITY
End: 2022-08-24

## 2019-10-17 RX ORDER — ACETAMINOPHEN 500 MG
TABLET ORAL
COMMUNITY

## 2019-10-17 RX ORDER — PROPRANOLOL HYDROCHLORIDE 120 MG/1
120 CAPSULE, EXTENDED RELEASE ORAL DAILY
COMMUNITY

## 2019-10-17 RX ORDER — MAGNESIUM 200 MG
TABLET ORAL
COMMUNITY

## 2019-10-17 NOTE — PROGRESS NOTES
Subjective:      Patient ID: Sheri Bates is a 74 y.o. female.    Chief Complaint: Follow-up    HPI: Patient with h/o Liver transplant (12/05/1993) due to Hep C. Patient reports doing good and is being followed by [facility].     Patient has h/o Migraine and is using propanolol for prophylaxis. These were really bad but the prophylactic medictions are helping and HA are improved in intensity and frequency,     Patient recent labs suggested Hypertriglyceridemia. Patient has been on high doses steroids for long after Liver transplant.  Patient reports polyuria, polydipsia, has some numbness in bilateral toes, intermittent blurry vision.     Patient has h/o Hypothyroidism. Patient reports compliance with medication. Patient still reports dry skin, hair loss, fatigue, cold intolerance, Last TSH was Normal.     Review of Systems   Constitutional: Negative for malaise/fatigue and weight loss.   HENT: Negative for ear pain, hearing loss, sinus pain and tinnitus.    Eyes: Negative for blurred vision, photophobia and discharge.   Respiratory: Negative for hemoptysis, shortness of breath and wheezing.    Cardiovascular: Negative for palpitations, orthopnea, leg swelling and PND.   Gastrointestinal: Negative for blood in stool, constipation, diarrhea, heartburn and melena.   Genitourinary: Positive for frequency. Negative for dysuria, hematuria and urgency.   Musculoskeletal: Negative for back pain.   Neurological: Positive for tingling. Negative for dizziness, tremors, seizures, loss of consciousness and headaches.   Endo/Heme/Allergies: Positive for polydipsia.   Psychiatric/Behavioral: Negative for depression and hallucinations. The patient does not have insomnia.      Objective:     Physical Exam   Constitutional: She is oriented to person, place, and time. No distress.   Neck: No thyromegaly present.   Cardiovascular: Normal rate, regular rhythm and normal heart sounds.   No murmur heard.  Pulmonary/Chest:  Effort normal and breath sounds normal. No respiratory distress. She has no wheezes. She exhibits no tenderness.   Abdominal: Soft. Bowel sounds are normal. She exhibits no distension. There is no tenderness.   Musculoskeletal: She exhibits no edema or tenderness.   Lymphadenopathy:     She has no cervical adenopathy.   Neurological: She is alert and oriented to person, place, and time.   Skin: She is not diaphoretic.   Psychiatric: She has a normal mood and affect. Her behavior is normal. Judgment and thought content normal.     Assessment:     1. Need for Streptococcus pneumoniae vaccination    2. Need for Tdap vaccination    3. Polydipsia    4. Hypothyroidism, unspecified type    5. Hypertriglyceridemia    6. Impaired fasting blood sugar       Plan:   Patient previous labs suggested impaired fasting blood sugar.  Two previous lab had blood sugar greater than 110 fasting.  Patient also complains of polydipsia, polyuria and numbness in bilateral hand suggesting diabetes.  Patient also had high triglycerides.  Will check A1c.  Patient has symptoms suggestive of hypothyroidism but TSH was normal.  Will check T3 and T4  Will order Tdap and pneumonia vaccine

## 2019-10-21 ENCOUNTER — PATIENT MESSAGE (OUTPATIENT)
Dept: INTERNAL MEDICINE | Facility: CLINIC | Age: 74
End: 2019-10-21

## 2019-10-21 ENCOUNTER — PATIENT MESSAGE (OUTPATIENT)
Dept: GASTROENTEROLOGY | Facility: CLINIC | Age: 74
End: 2019-10-21

## 2019-12-31 LAB
EXT ALBUMIN: 4.6
EXT ALKALINE PHOSPHATASE: 79
EXT ALT: 14
EXT AST: 25
EXT BASOPHIL%: 0.2
EXT BILIRUBIN TOTAL: 0.64
EXT BUN: 14.1
EXT CALCIUM: 9.5
EXT CHLORIDE: 106
EXT CO2: 29
EXT CREATININE: 0.93 MG/DL
EXT EOSINOPHIL%: 1.8
EXT GLUCOSE: 96
EXT HEMATOCRIT: 37.3
EXT HEMOGLOBIN: 12
EXT LYMPH%: 37.2
EXT MONOCYTES%: 10.4
EXT PLATELETS: 201
EXT POTASSIUM: 3.9
EXT PROTEIN TOTAL: 7.8
EXT SEGS%: 50.2
EXT SIROLIMUS LVL: 3
EXT SODIUM: 145 MMOL/L
EXT WBC: 4.41

## 2020-01-03 ENCOUNTER — PATIENT MESSAGE (OUTPATIENT)
Dept: TRANSPLANT | Facility: CLINIC | Age: 75
End: 2020-01-03

## 2020-01-03 DIAGNOSIS — Z94.4 STATUS POST LIVER TRANSPLANT: ICD-10-CM

## 2020-01-03 RX ORDER — SIROLIMUS 1 MG/1
2 TABLET, FILM COATED ORAL DAILY
Qty: 180 TABLET | Refills: 3 | Status: SHIPPED | OUTPATIENT
Start: 2020-01-03 | End: 2020-12-03 | Stop reason: SDUPTHER

## 2020-01-03 NOTE — TELEPHONE ENCOUNTER
Patient notified and instructed via voicemail message and [facility], with instructions to increase Sirolimus dose to 2mg daily and repeat labs in 2 weeks (1/13/2020).

## 2020-01-03 NOTE — TELEPHONE ENCOUNTER
----- Message from Melanie Gifford MD sent at 1/3/2020  2:05 PM CST -----  Increase sirolimus to 2 mg daily and repeat labs in 2 weeks- please let patient know.

## 2020-01-08 ENCOUNTER — PATIENT MESSAGE (OUTPATIENT)
Dept: TRANSPLANT | Facility: CLINIC | Age: 75
End: 2020-01-08

## 2020-01-09 ENCOUNTER — OFFICE VISIT (OUTPATIENT)
Dept: INTERNAL MEDICINE | Facility: CLINIC | Age: 75
End: 2020-01-09
Payer: MEDICARE

## 2020-01-09 VITALS
OXYGEN SATURATION: 99 % | WEIGHT: 166 LBS | SYSTOLIC BLOOD PRESSURE: 131 MMHG | DIASTOLIC BLOOD PRESSURE: 73 MMHG | HEART RATE: 61 BPM | TEMPERATURE: 98 F | BODY MASS INDEX: 27.66 KG/M2 | HEIGHT: 65 IN

## 2020-01-09 DIAGNOSIS — E03.9 HYPOTHYROIDISM, UNSPECIFIED TYPE: ICD-10-CM

## 2020-01-09 DIAGNOSIS — E78.1 HYPERTRIGLYCERIDEMIA: Primary | ICD-10-CM

## 2020-01-09 DIAGNOSIS — R30.0 DYSURIA: ICD-10-CM

## 2020-01-09 DIAGNOSIS — N39.0 URINARY TRACT INFECTION WITHOUT HEMATURIA, SITE UNSPECIFIED: Primary | ICD-10-CM

## 2020-01-09 PROCEDURE — 99214 PR OFFICE/OUTPT VISIT, EST, LEVL IV, 30-39 MIN: ICD-10-PCS | Mod: S$GLB,,, | Performed by: INTERNAL MEDICINE

## 2020-01-09 PROCEDURE — 1159F PR MEDICATION LIST DOCUMENTED IN MEDICAL RECORD: ICD-10-PCS | Mod: S$GLB,,, | Performed by: INTERNAL MEDICINE

## 2020-01-09 PROCEDURE — 1159F MED LIST DOCD IN RCRD: CPT | Mod: S$GLB,,, | Performed by: INTERNAL MEDICINE

## 2020-01-09 PROCEDURE — 99214 OFFICE O/P EST MOD 30 MIN: CPT | Mod: S$GLB,,, | Performed by: INTERNAL MEDICINE

## 2020-01-09 RX ORDER — CEPHALEXIN 500 MG/1
500 CAPSULE ORAL EVERY 12 HOURS
Qty: 14 CAPSULE | Refills: 0 | Status: SHIPPED | OUTPATIENT
Start: 2020-01-09 | End: 2020-01-16

## 2020-01-09 NOTE — PROGRESS NOTES
Subjective:      Patient ID: Sheri Bates is a 74 y.o. female.    Chief Complaint: Follow-up and Urinary Tract Infection    HPI: Patient with h/o Liver transplant (12/05/1993) due to Hep C. Patient reports doing good and is being followed by [facility].     Patient has h/o Migraine and is using propanolol for prophylaxis. These were really bad but the prophylactic medictions are helping and HA are improved in intensity and frequency,     Patient has h/o Hypothyroidism. Patient reports compliance with medication. Patient still reports dry skin, hair loss, fatigue, cold intolerance, Last TSH was Normal.     Patient today complain of dysuria x1 week Patient reportsthe symptoms are intermittent, along with urinary frequency, sometime has feeling of incomplete void, no fever or chills, mild flank pain yesterday, no hematuria.     Review of Systems   Constitutional: Negative for chills, diaphoresis, fever, malaise/fatigue and weight loss.   HENT: Negative for ear pain, hearing loss, sinus pain and tinnitus.    Eyes: Negative for blurred vision, photophobia and discharge.   Respiratory: Negative for cough, hemoptysis, shortness of breath and wheezing.    Cardiovascular: Negative for chest pain, palpitations, orthopnea, leg swelling and PND.   Gastrointestinal: Negative for blood in stool, diarrhea, heartburn, melena, nausea and vomiting.   Genitourinary: Positive for dysuria, flank pain and frequency. Negative for hematuria.   Musculoskeletal: Negative for back pain, myalgias and neck pain.   Skin: Negative for rash.   Neurological: Negative for dizziness, tingling, tremors, seizures and loss of consciousness.   Endo/Heme/Allergies: Positive for polydipsia.   Psychiatric/Behavioral: Negative for depression and hallucinations. The patient does not have insomnia.      Objective:     Physical Exam   Constitutional: She is oriented to person, place, and time. No distress.   Neck: No thyromegaly present.    Cardiovascular: Normal rate, regular rhythm and normal heart sounds.   No murmur heard.  Pulmonary/Chest: Effort normal and breath sounds normal. No respiratory distress. She has no wheezes. She exhibits no tenderness.   Abdominal: Soft. Bowel sounds are normal. She exhibits no distension. There is no tenderness.   Musculoskeletal: She exhibits no edema or tenderness.   Lymphadenopathy:     She has no cervical adenopathy.   Neurological: She is alert and oriented to person, place, and time.   Skin: She is not diaphoretic.   Psychiatric: She has a normal mood and affect. Her behavior is normal. Judgment and thought content normal.     Assessment:     1. Hypertriglyceridemia    2. Hypothyroidism, unspecified type    3. Dysuria       Plan:   Last labs suggested hypertriglyceridemia.. Will check Lipid profile again  Last TSH was at goal. Will check TSH again  Patient has symptoms suggestive of UTI.. Will check UA.

## 2020-01-13 ENCOUNTER — PATIENT MESSAGE (OUTPATIENT)
Dept: TRANSPLANT | Facility: CLINIC | Age: 75
End: 2020-01-13

## 2020-01-13 DIAGNOSIS — E03.9 HYPOTHYROIDISM, UNSPECIFIED TYPE: Primary | ICD-10-CM

## 2020-01-13 RX ORDER — LEVOTHYROXINE SODIUM 75 UG/1
75 TABLET ORAL DAILY
Qty: 30 TABLET | Refills: 11 | Status: SHIPPED | OUTPATIENT
Start: 2020-01-13 | End: 2021-01-12

## 2020-01-14 ENCOUNTER — PATIENT MESSAGE (OUTPATIENT)
Dept: TRANSPLANT | Facility: CLINIC | Age: 75
End: 2020-01-14

## 2020-01-15 ENCOUNTER — PATIENT MESSAGE (OUTPATIENT)
Dept: TRANSPLANT | Facility: CLINIC | Age: 75
End: 2020-01-15

## 2020-01-15 ENCOUNTER — TELEPHONE (OUTPATIENT)
Dept: TRANSPLANT | Facility: CLINIC | Age: 75
End: 2020-01-15

## 2020-01-15 NOTE — TELEPHONE ENCOUNTER
----- Message from Vivi Pete, PharmD sent at 1/15/2020 10:13 AM CST -----  Regarding: Pt advice- medical marijuana  Hi Jeanine- I don't see that she got a level checked since 12/30 (3.0) so im not sure why she said if the marijuana increased her level? Either way, medical marijuana (either CBD or THC formulations) will interact with the sirolimus through CYP inhibition. This can cause the sirolimus levels to increase, which will likely be reflected in her next level drawn. Also, we did increase the dose so that in combination with the medical marijuana will likely increase her level.

## 2020-01-15 NOTE — TELEPHONE ENCOUNTER
Informed pt of warning of pharmacist that medical marijuana can increase Sirolimus levels. Advised pt refrain , will await lab results.

## 2020-01-18 ENCOUNTER — PATIENT MESSAGE (OUTPATIENT)
Dept: TRANSPLANT | Facility: CLINIC | Age: 75
End: 2020-01-18

## 2020-01-23 ENCOUNTER — PATIENT MESSAGE (OUTPATIENT)
Dept: TRANSPLANT | Facility: CLINIC | Age: 75
End: 2020-01-23

## 2020-01-23 LAB — EXT SIROLIMUS LVL: 8.7

## 2020-01-24 ENCOUNTER — PATIENT MESSAGE (OUTPATIENT)
Dept: TRANSPLANT | Facility: CLINIC | Age: 75
End: 2020-01-24

## 2020-01-24 ENCOUNTER — TELEPHONE (OUTPATIENT)
Dept: TRANSPLANT | Facility: CLINIC | Age: 75
End: 2020-01-24

## 2020-01-24 NOTE — TELEPHONE ENCOUNTER
----- Message from Angela Lopes MD sent at 1/23/2020  7:43 PM CST -----  Reviewed, nothing to do; repeat per routine

## 2020-01-27 ENCOUNTER — TELEPHONE (OUTPATIENT)
Dept: TRANSPLANT | Facility: CLINIC | Age: 75
End: 2020-01-27

## 2020-01-27 ENCOUNTER — PATIENT MESSAGE (OUTPATIENT)
Dept: TRANSPLANT | Facility: CLINIC | Age: 75
End: 2020-01-27

## 2020-01-27 NOTE — TELEPHONE ENCOUNTER
Patient notified and instructed via portal: labs due 3/2/20;  Standing Lab Orders faxed to her local lab.

## 2020-03-24 ENCOUNTER — TELEPHONE (OUTPATIENT)
Dept: INTERNAL MEDICINE | Facility: CLINIC | Age: 75
End: 2020-03-24

## 2020-03-24 DIAGNOSIS — N39.0 URINARY TRACT INFECTION WITHOUT HEMATURIA, SITE UNSPECIFIED: Primary | ICD-10-CM

## 2020-03-24 RX ORDER — CEPHALEXIN 500 MG/1
500 CAPSULE ORAL EVERY 12 HOURS
Qty: 14 CAPSULE | Refills: 0 | Status: SHIPPED | OUTPATIENT
Start: 2020-03-24 | End: 2020-03-31

## 2020-03-24 NOTE — TELEPHONE ENCOUNTER
Pt called wanted to know if MD would call out something (capsule) for a UTI again.  Pt having some pain after urination no odor, burning nor dark color...    ALSO..the patient just found out daughter have Cancer and now anxiety level is high..

## 2020-03-25 ENCOUNTER — OFFICE VISIT (OUTPATIENT)
Dept: INTERNAL MEDICINE | Facility: CLINIC | Age: 75
End: 2020-03-25
Payer: MEDICARE

## 2020-03-25 DIAGNOSIS — E03.9 HYPOTHYROIDISM, UNSPECIFIED TYPE: ICD-10-CM

## 2020-03-25 DIAGNOSIS — N39.0 URINARY TRACT INFECTION WITHOUT HEMATURIA, SITE UNSPECIFIED: ICD-10-CM

## 2020-03-25 DIAGNOSIS — F41.9 ANXIETY: Primary | ICD-10-CM

## 2020-03-25 PROCEDURE — 99214 OFFICE O/P EST MOD 30 MIN: CPT | Mod: 95,,, | Performed by: INTERNAL MEDICINE

## 2020-03-25 PROCEDURE — 99214 PR OFFICE/OUTPT VISIT, EST, LEVL IV, 30-39 MIN: ICD-10-PCS | Mod: 95,,, | Performed by: INTERNAL MEDICINE

## 2020-03-25 RX ORDER — LORAZEPAM 0.5 MG/1
0.5 TABLET ORAL EVERY 12 HOURS PRN
Qty: 20 TABLET | Refills: 0 | Status: SHIPPED | OUTPATIENT
Start: 2020-03-25 | End: 2020-04-04

## 2020-03-25 NOTE — PROGRESS NOTES
Subjective:      Patient ID: Sheri Bates is a 75 y.o. female.    Chief Complaint: Anxiety    The patient location is: [facility]  The chief complaint leading to consultation is: Anxiety  Visit type: Virtual visit with synchronous audio and video  Total time spent with patient: 15minutes  Each patient to whom he or she provides medical services by telemedicine is:  (1) informed of the relationship between the physician and patient and the respective role of any other health care provider with respect to management of the patient; and (2) notified that he or she may decline to receive medical services by telemedicine and may withdraw from such care at any time.    Notes:     HPI: Patient with h/o Liver transplant (12/05/1993) due to Hep C. Patient reports doing good and is being followed by [facility].     Patient today called with complains of anxiety x 5 days.  Patient reports her daughter is diagnosed with stage IV lung cancer with Mets + also because of corona virus and patient being on immunosuppressant is very scared.  Patient reports feeling anxious, heart beating fast, some shortness of breath, no chest pain.  These symptoms are mostly at night and improved on their own.  Patient reports feeling depressed as well, has sums crying spells, no lack of energy/interest, no feeling of guilt and worthlessness, no suicidal or homicidal ideations.  Patient reports sleep is decreased but appetite is okay    Patient has history of hypothyroidism and taking levothyroxine regularly.  Patient last TSH was elevated in levothyroxine dose was increased.  Patient reports some weight loss with it, has some palpitation (when anxious), no dry skin, hair loss    Patient was started on Keflex for UTI.  Patient reports symptoms are not improving with few doses of the medication.  Patient denies any fever, flank pain      Review of Systems   Constitutional: Negative for chills, diaphoresis, fever, malaise/fatigue and  weight loss.   HENT: Negative for ear pain, hearing loss, sinus pain and tinnitus.    Eyes: Negative for blurred vision, photophobia and discharge.   Respiratory: Negative for cough, hemoptysis and wheezing.    Cardiovascular: Negative for orthopnea, leg swelling and PND.   Gastrointestinal: Negative for blood in stool, diarrhea, heartburn, melena and vomiting.   Genitourinary: Positive for dysuria (improved with Keflex). Negative for flank pain, frequency and hematuria.   Musculoskeletal: Negative for back pain, myalgias and neck pain.   Skin: Negative for rash.   Neurological: Negative for tingling, tremors, seizures and loss of consciousness.   Endo/Heme/Allergies: Positive for polydipsia.   Psychiatric/Behavioral: Negative for depression and hallucinations. The patient is nervous/anxious.      Objective:   Patient not examined.     Assessment:     1. Anxiety    2. Hypothyroidism, unspecified type    3. Urinary tract infection without hematuria, site unspecified       Plan:   Patient seem to have anxiety/stress.   SSRIs and benzodiazepines are discussed with patient in detail.  Patient reports symptoms are not very often and does not think that she will need the medication for long.  Will prescribe lorazepam for short period  Advised patient about possible side effects including sedation, dependence, decreased reflexes.  Patient last TSH was elevated in levothyroxine dose was adjusted.  Will check thyroid function on return  Patient complain of urinary tract symptoms and is started on Keflex.  Symptoms seems to be improved  Will continue Keflex

## 2020-06-05 ENCOUNTER — TELEPHONE (OUTPATIENT)
Dept: TRANSPLANT | Facility: CLINIC | Age: 75
End: 2020-06-05

## 2020-06-05 LAB
EXT ALBUMIN: 4.4
EXT ALKALINE PHOSPHATASE: 77
EXT ALT: 15
EXT AST: 21
EXT BILIRUBIN TOTAL: 0.7
EXT BUN: 20
EXT CALCIUM: 9.9
EXT CHLORIDE: 105
EXT CHOLESTEROL: 166
EXT CO2: 29
EXT CREATININE: 0.8 MG/DL
EXT EOSINOPHIL%: 2
EXT GFR MDRD NON AF AMER: 70
EXT GLUCOSE: 97
EXT HDL: 46
EXT HEMATOCRIT: 38.2
EXT HEMOGLOBIN: 11.8
EXT LDL CHOLESTEROL: 97
EXT LYMPH%: 35.8
EXT MONOCYTES%: 10.7
EXT PLATELETS: 227
EXT POTASSIUM: 4.3
EXT PROTEIN TOTAL: 7.6
EXT SEGS%: 50.6
EXT SIROLIMUS LVL: 5.9
EXT SODIUM: 144 MMOL/L
EXT TRIGLYCERIDES: 116
EXT WBC: 4.5

## 2020-06-05 NOTE — TELEPHONE ENCOUNTER
----- Message from Angela Lopes MD sent at 6/5/2020  4:53 PM CDT -----  Reviewed, nothing to do; repeat per routine

## 2020-06-19 ENCOUNTER — TELEPHONE (OUTPATIENT)
Dept: INTERNAL MEDICINE | Facility: CLINIC | Age: 75
End: 2020-06-19

## 2020-06-19 NOTE — TELEPHONE ENCOUNTER
----- Message from Nikole Go sent at 6/19/2020  2:19 PM CDT -----  Regarding: refill  Contact: pt  Patient states she needs to get refill for posstium 10. 30 or 90 day supply pharmacy  [facility] in [place]. Patient needs a return umer to [phone]

## 2020-06-19 NOTE — TELEPHONE ENCOUNTER
----- Message from William Smith sent at 6/19/2020  2:54 PM CDT -----  Contact: self  Type:  Patient Returning Call    Who Called:pt  Who Left Message for Patient:n/a  Does the patient know what this is regarding?:no  Would the patient rather a call back or a response via [facility]? Call back  Best Call Back Number:[phone]  Additional Information: none

## 2020-09-18 ENCOUNTER — PATIENT MESSAGE (OUTPATIENT)
Dept: TRANSPLANT | Facility: CLINIC | Age: 75
End: 2020-09-18

## 2020-10-01 ENCOUNTER — PATIENT MESSAGE (OUTPATIENT)
Dept: OTHER | Facility: OTHER | Age: 75
End: 2020-10-01

## 2020-10-07 LAB
EXT ALBUMIN: 4.2
EXT ALKALINE PHOSPHATASE: 69
EXT ALT: 10
EXT AST: 16
EXT BILIRUBIN TOTAL: 0.56
EXT BUN: 19
EXT CALCIUM: 9.9
EXT CHLORIDE: 106
EXT CHOLESTEROL: 159
EXT CO2: 27
EXT CREATININE: 0.98 MG/DL
EXT EOSINOPHIL%: 2.4
EXT GFR MDRD NON AF AMER: 55.4
EXT GLUCOSE: 99
EXT HDL: 37
EXT HEMATOCRIT: 44.1
EXT HEMOGLOBIN: 11.9
EXT LDL CHOLESTEROL: 88
EXT LYMPH%: 38.4
EXT MONOCYTES%: 9.2
EXT PLATELETS: 210
EXT POTASSIUM: 4.1
EXT PROTEIN TOTAL: 7.4
EXT SEGS%: 49.3
EXT SIROLIMUS LVL: 4.5
EXT SODIUM: 141 MMOL/L
EXT TRIGLYCERIDES: 170
EXT WBC: 4.2

## 2020-10-09 ENCOUNTER — TELEPHONE (OUTPATIENT)
Dept: TRANSPLANT | Facility: CLINIC | Age: 75
End: 2020-10-09

## 2020-10-09 NOTE — TELEPHONE ENCOUNTER
----- Message from Angela Lopes MD sent at 10/9/2020  4:07 PM CDT -----  Liver tests ok, patient should f/u with PCP on her triglycerides.

## 2020-12-03 ENCOUNTER — PATIENT MESSAGE (OUTPATIENT)
Dept: TRANSPLANT | Facility: CLINIC | Age: 75
End: 2020-12-03

## 2020-12-03 DIAGNOSIS — Z94.4 STATUS POST LIVER TRANSPLANT: ICD-10-CM

## 2020-12-05 RX ORDER — SIROLIMUS 1 MG/1
2 TABLET, FILM COATED ORAL DAILY
Qty: 180 TABLET | Refills: 0 | Status: SHIPPED | OUTPATIENT
Start: 2020-12-05 | End: 2021-03-05 | Stop reason: SDUPTHER

## 2020-12-07 ENCOUNTER — PATIENT MESSAGE (OUTPATIENT)
Dept: TRANSPLANT | Facility: CLINIC | Age: 75
End: 2020-12-07

## 2020-12-11 ENCOUNTER — PATIENT MESSAGE (OUTPATIENT)
Dept: OTHER | Facility: OTHER | Age: 75
End: 2020-12-11

## 2020-12-20 ENCOUNTER — PATIENT MESSAGE (OUTPATIENT)
Dept: PRIMARY CARE CLINIC | Facility: CLINIC | Age: 75
End: 2020-12-20

## 2020-12-22 ENCOUNTER — PATIENT MESSAGE (OUTPATIENT)
Dept: TRANSPLANT | Facility: CLINIC | Age: 75
End: 2020-12-22

## 2020-12-29 ENCOUNTER — TELEPHONE (OUTPATIENT)
Dept: TRANSPLANT | Facility: CLINIC | Age: 75
End: 2020-12-29

## 2020-12-29 ENCOUNTER — PATIENT MESSAGE (OUTPATIENT)
Dept: TRANSPLANT | Facility: CLINIC | Age: 75
End: 2020-12-29

## 2020-12-29 NOTE — TELEPHONE ENCOUNTER
Message received from patient via [facility]:    I didn't go get my blood work Monday (28th) because my daughter passed away on Christmas Eve. I am going to try to go this coming Monday to get the blood work.

## 2021-01-06 LAB
EXT ALBUMIN: 4.2
EXT ALKALINE PHOSPHATASE: 72
EXT ALT: 12
EXT AST: 15
EXT BILIRUBIN TOTAL: 0.5
EXT BUN: 18
EXT CALCIUM: 9.7
EXT CHLORIDE: 108
EXT CHOLESTEROL: 144
EXT CO2: 30
EXT CREATININE: 0.8 MG/DL
EXT EOSINOPHIL%: 1.7
EXT GFR MDRD NON AF AMER: 69
EXT GLUCOSE: 93
EXT HDL: 44
EXT HEMATOCRIT: 36.1
EXT HEMOGLOBIN: 11.3
EXT LDL CHOLESTEROL: 74
EXT LYMPH%: 44.8
EXT MONOCYTES%: 9
EXT PLATELETS: 237
EXT POTASSIUM: 4.1
EXT PROTEIN TOTAL: 7.2
EXT SEGS%: 43.8
EXT SIROLIMUS LVL: 4.3
EXT SODIUM: 144 MMOL/L
EXT TRIGLYCERIDES: 131
EXT WBC: 4.2

## 2021-01-08 ENCOUNTER — PATIENT MESSAGE (OUTPATIENT)
Dept: TRANSPLANT | Facility: CLINIC | Age: 76
End: 2021-01-08

## 2021-01-11 ENCOUNTER — TELEPHONE (OUTPATIENT)
Dept: TRANSPLANT | Facility: CLINIC | Age: 76
End: 2021-01-11

## 2021-03-05 DIAGNOSIS — Z94.4 STATUS POST LIVER TRANSPLANT: ICD-10-CM

## 2021-03-05 RX ORDER — SIROLIMUS 1 MG/1
2 TABLET, FILM COATED ORAL DAILY
Qty: 180 TABLET | Refills: 1 | Status: SHIPPED | OUTPATIENT
Start: 2021-03-05 | End: 2021-08-20 | Stop reason: SDUPTHER

## 2021-04-14 LAB
EXT ALBUMIN: 4.1
EXT ALKALINE PHOSPHATASE: 74
EXT ALT: 12
EXT AST: 18
EXT BILIRUBIN TOTAL: 0.4
EXT BUN: 15
EXT CALCIUM: 9.4
EXT CHLORIDE: 109
EXT CHOLESTEROL: 160
EXT CO2: 31
EXT CREATININE: 0.88 MG/DL
EXT EOSINOPHIL%: 1.9
EXT GFR MDRD NON AF AMER: 63
EXT GLUCOSE: 110
EXT HDL: 35
EXT HEMATOCRIT: 46.8
EXT HEMOGLOBIN: 11.6
EXT LDL CHOLESTEROL: 89
EXT LYMPH%: 40.3
EXT MONOCYTES%: 10.7
EXT PLATELETS: 219
EXT POTASSIUM: 3.8
EXT PROTEIN TOTAL: 7
EXT SEGS%: 46.4
EXT SIROLIMUS LVL: 5.5
EXT SODIUM: 146 MMOL/L
EXT TRIGLYCERIDES: 178
EXT WBC: 4.2

## 2021-04-20 ENCOUNTER — TELEPHONE (OUTPATIENT)
Dept: TRANSPLANT | Facility: CLINIC | Age: 76
End: 2021-04-20

## 2021-07-13 ENCOUNTER — TELEPHONE (OUTPATIENT)
Dept: TRANSPLANT | Facility: CLINIC | Age: 76
End: 2021-07-13

## 2021-07-13 ENCOUNTER — PATIENT MESSAGE (OUTPATIENT)
Dept: TRANSPLANT | Facility: CLINIC | Age: 76
End: 2021-07-13

## 2021-07-27 LAB
EXT ALBUMIN: 3.96
EXT ALKALINE PHOSPHATASE: 71
EXT ALT: 15
EXT AST: 25
EXT BILIRUBIN TOTAL: 0.68
EXT BUN: 21
EXT CALCIUM: 9.6
EXT CHLORIDE: 103
EXT CHOLESTEROL: 175
EXT CO2: 29
EXT CREATININE: 1.1 MG/DL
EXT EOSINOPHIL%: 2
EXT GFR MDRD NON AF AMER: 49
EXT GLUCOSE: 113
EXT HDL: 30
EXT HEMATOCRIT: 36.4
EXT HEMOGLOBIN: 11.5
EXT LDL CHOLESTEROL: 82
EXT LYMPH%: 36.2
EXT MONOCYTES%: 9.9
EXT PLATELETS: 192
EXT POTASSIUM: 3.4
EXT PROTEIN TOTAL: 7.5
EXT SEGS%: 51.3
EXT SIROLIMUS LVL: 5.5
EXT SODIUM: 139 MMOL/L
EXT TRIGLYCERIDES: 316
EXT WBC: 4.5

## 2021-07-30 ENCOUNTER — TELEPHONE (OUTPATIENT)
Dept: TRANSPLANT | Facility: CLINIC | Age: 76
End: 2021-07-30

## 2021-08-20 ENCOUNTER — TELEPHONE (OUTPATIENT)
Dept: HEPATOLOGY | Facility: CLINIC | Age: 76
End: 2021-08-20

## 2021-08-20 ENCOUNTER — PATIENT MESSAGE (OUTPATIENT)
Dept: HEPATOLOGY | Facility: CLINIC | Age: 76
End: 2021-08-20

## 2021-08-20 DIAGNOSIS — Z94.4 STATUS POST LIVER TRANSPLANT: ICD-10-CM

## 2021-08-24 RX ORDER — SIROLIMUS 1 MG/1
2 TABLET, FILM COATED ORAL DAILY
Qty: 180 TABLET | Refills: 3 | Status: SHIPPED | OUTPATIENT
Start: 2021-08-24 | End: 2021-10-04 | Stop reason: SDUPTHER

## 2021-10-27 LAB
EXT ALBUMIN: 4.11
EXT ALKALINE PHOSPHATASE: 82.4
EXT ALT: 16
EXT AST: 28.9
EXT BASOPHIL%: 0.6
EXT BILIRUBIN TOTAL: 0.51
EXT BUN: 31.2
EXT CALCIUM: 9.63
EXT CHLORIDE: 103.3
EXT CO2: 27.1
EXT CREATININE: 1.02 MG/DL
EXT EOSINOPHIL%: 2.1
EXT GFR MDRD NON AF AMER: 52.74
EXT GLUCOSE: 113
EXT HEMATOCRIT: 34.8
EXT HEMOGLOBIN: 11
EXT LYMPH%: 35.4
EXT MONOCYTES%: 11.4
EXT PLATELETS: 262
EXT POTASSIUM: 3.72
EXT PROTEIN TOTAL: 7.9
EXT SEGS%: 50.1
EXT SIROLIMUS LVL: 9.1
EXT SODIUM: 139.7 MMOL/L
EXT WBC: 5.34

## 2021-10-28 ENCOUNTER — TELEPHONE (OUTPATIENT)
Dept: TRANSPLANT | Facility: CLINIC | Age: 76
End: 2021-10-28
Payer: MEDICARE

## 2022-01-07 ENCOUNTER — TELEPHONE (OUTPATIENT)
Dept: TRANSPLANT | Facility: CLINIC | Age: 77
End: 2022-01-07
Payer: MEDICARE

## 2022-01-07 NOTE — TELEPHONE ENCOUNTER
----- Message from Chacho Denton sent at 1/7/2022 11:28 AM CST -----  Regarding: call back  Pt call to speak with [facility] in regards to lab work requesting call back    Call

## 2022-01-07 NOTE — TELEPHONE ENCOUNTER
Returned patient call; she wanted to confirm when next labs due;  Informed her labs due again on Monday 1/10/22. She was able to repeat instructions and voiced understanding.

## 2022-01-19 LAB
EXT ALBUMIN: 4.2
EXT ALKALINE PHOSPHATASE: 64
EXT ALT: 16
EXT AST: 29
EXT BILIRUBIN TOTAL: 0.66
EXT BUN: 26
EXT CALCIUM: 10.1
EXT CHLORIDE: 106
EXT CHOLESTEROL: 143
EXT CO2: 28
EXT CREATININE: 1.2 MG/DL
EXT EOSINOPHIL%: 3.1
EXT GFR MDRD NON AF AMER: 44
EXT GLUCOSE: 103
EXT HDL: 34
EXT HEMATOCRIT: 32.9
EXT HEMOGLOBIN: 10.5
EXT LDL CHOLESTEROL: 79
EXT LYMPH%: 30.9
EXT MONOCYTES%: 11.8
EXT PLATELETS: 230
EXT POTASSIUM: 4.2
EXT PROTEIN TOTAL: 7.6
EXT SEGS%: 53.5
EXT SIROLIMUS LVL: 4.2
EXT SODIUM: 141 MMOL/L
EXT TRIGLYCERIDES: 149
EXT WBC: 4.2

## 2022-01-20 ENCOUNTER — TELEPHONE (OUTPATIENT)
Dept: TRANSPLANT | Facility: CLINIC | Age: 77
End: 2022-01-20
Payer: MEDICARE

## 2022-01-20 NOTE — TELEPHONE ENCOUNTER
----- Message from Angela Lopes MD sent at 1/20/2022 10:35 AM CST -----  Reviewed, nothing to do; repeat per routine

## 2022-04-14 LAB
EXT ALBUMIN: 3.9
EXT ALKALINE PHOSPHATASE: 76
EXT ALT: 16
EXT AST: 27
EXT BILIRUBIN TOTAL: 0.59
EXT BUN: 19
EXT CALCIUM: 9.3
EXT CHLORIDE: 109
EXT CO2: 28
EXT CREATININE: 0.9 MG/DL
EXT EOSINOPHIL%: 1.9
EXT GFR MDRD NON AF AMER: 61
EXT GLUCOSE: 103
EXT HEMATOCRIT: 36.6
EXT HEMOGLOBIN: 11.7
EXT LYMPH%: 41.4
EXT MONOCYTES%: 11.8
EXT PLATELETS: 230
EXT POTASSIUM: 3.9
EXT PROTEIN TOTAL: 7.5
EXT SEGS%: 44.3
EXT SIROLIMUS LVL: 5.9
EXT SODIUM: 142 MMOL/L
EXT WBC: 4.8

## 2022-04-20 ENCOUNTER — TELEPHONE (OUTPATIENT)
Dept: TRANSPLANT | Facility: CLINIC | Age: 77
End: 2022-04-20
Payer: MEDICARE

## 2022-04-20 NOTE — TELEPHONE ENCOUNTER
----- Message from Adán Lazaor RN sent at 4/20/2022 10:48 AM CDT -----    ----- Message -----  From: Mandy Marie MD  Sent: 4/18/2022   2:59 PM CDT  To: Angela Lopes MD, #    Reviewed normal , watch % lymphs, labs routine

## 2022-05-11 ENCOUNTER — PATIENT MESSAGE (OUTPATIENT)
Dept: RESEARCH | Facility: CLINIC | Age: 77
End: 2022-05-11
Payer: MEDICARE

## 2022-07-13 LAB
EXT ALBUMIN: 4.2
EXT ALKALINE PHOSPHATASE: 69
EXT ALT: 11.8
EXT AST: 22.8
EXT BILIRUBIN TOTAL: 0.6
EXT BUN: 20
EXT CALCIUM: 9.4
EXT CHLORIDE: 109
EXT CHOLESTEROL: 157
EXT CO2: 30
EXT CREATININE: 1 MG/DL
EXT EOSINOPHIL%: 1.6
EXT GFR MDRD NON AF AMER: 54
EXT GLUCOSE: 97
EXT HDL: 35
EXT HEMATOCRIT: 35
EXT HEMOGLOBIN: 11.1
EXT LDL CHOLESTEROL: 86
EXT LYMPH%: 32.6
EXT MONOCYTES%: 9.5
EXT PLATELETS: 188
EXT POTASSIUM: 3.8
EXT PROTEIN TOTAL: 7.5
EXT SEGS%: 55.5
EXT SIROLIMUS LVL: 5.2
EXT SODIUM: 143 MMOL/L
EXT TRIGLYCERIDES: 178
EXT WBC: 5.16

## 2022-07-26 ENCOUNTER — TELEPHONE (OUTPATIENT)
Dept: TRANSPLANT | Facility: CLINIC | Age: 77
End: 2022-07-26
Payer: MEDICARE

## 2022-07-26 NOTE — TELEPHONE ENCOUNTER
----- Message from Angela Lopes MD sent at 7/21/2022  5:50 PM CDT -----  Reviewed, nothing to do; repeat per routine

## 2022-08-24 ENCOUNTER — OFFICE VISIT (OUTPATIENT)
Dept: HEPATOLOGY | Facility: CLINIC | Age: 77
End: 2022-08-24
Payer: MEDICARE

## 2022-08-24 VITALS
HEIGHT: 65 IN | SYSTOLIC BLOOD PRESSURE: 122 MMHG | WEIGHT: 146.81 LBS | HEART RATE: 70 BPM | BODY MASS INDEX: 24.46 KG/M2 | DIASTOLIC BLOOD PRESSURE: 78 MMHG

## 2022-08-24 DIAGNOSIS — M79.605 PAIN OF LEFT LOWER EXTREMITY: ICD-10-CM

## 2022-08-24 DIAGNOSIS — Z94.4 LIVER TRANSPLANTED: ICD-10-CM

## 2022-08-24 DIAGNOSIS — D84.9 IMMUNOSUPPRESSION: Primary | ICD-10-CM

## 2022-08-24 PROCEDURE — 99213 PR OFFICE/OUTPT VISIT, EST, LEVL III, 20-29 MIN: ICD-10-PCS | Mod: S$PBB,,, | Performed by: INTERNAL MEDICINE

## 2022-08-24 PROCEDURE — 99999 PR PBB SHADOW E&M-EST. PATIENT-LVL III: ICD-10-PCS | Mod: PBBFAC,,, | Performed by: INTERNAL MEDICINE

## 2022-08-24 PROCEDURE — 99999 PR PBB SHADOW E&M-EST. PATIENT-LVL III: CPT | Mod: PBBFAC,,, | Performed by: INTERNAL MEDICINE

## 2022-08-24 PROCEDURE — 99213 OFFICE O/P EST LOW 20 MIN: CPT | Mod: PBBFAC | Performed by: INTERNAL MEDICINE

## 2022-08-24 PROCEDURE — 99213 OFFICE O/P EST LOW 20 MIN: CPT | Mod: S$PBB,,, | Performed by: INTERNAL MEDICINE

## 2022-08-24 RX ORDER — NIACIN 250 MG
250 TABLET ORAL NIGHTLY
COMMUNITY

## 2022-08-24 RX ORDER — ATORVASTATIN CALCIUM 20 MG/1
TABLET, FILM COATED ORAL
COMMUNITY
Start: 2020-12-10 | End: 2024-01-23

## 2022-08-24 NOTE — PROGRESS NOTES
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    ORGAN: LIVER  Whole or Partial: whole liver        Subjective:     Interval History:  Currently, she is doing with difficulty. Current complaints include issues with her left leg.  She has been doing relatively well and was trying to move around a lot but then she had what sounds like ABIs performed in developed significant pain afterwards in her knee and on both sides the leg around the knee.  She has follow up with her providers around this but has not gotten much management.  This has limited her mobility.  Otherwise I have asked see the patient for around 3 or 4 years but she denies any significant issues, complications, diagnoses, hospitalizations.  She has had occasional issues with skin cancers which she has continued in treatment.      Review of Systems    Objective:     Physical Exam  Vitals reviewed.   Constitutional:       General: She is not in acute distress.     Appearance: She is well-developed.   HENT:      Head: Normocephalic and atraumatic.      Mouth/Throat:      Pharynx: No oropharyngeal exudate.   Eyes:      General: No scleral icterus.        Right eye: No discharge.         Left eye: No discharge.      Conjunctiva/sclera: Conjunctivae normal.      Pupils: Pupils are equal, round, and reactive to light.   Pulmonary:      Effort: Pulmonary effort is normal. No respiratory distress.      Breath sounds: Normal breath sounds. No wheezing.   Abdominal:      General: There is no distension.      Palpations: Abdomen is soft.      Tenderness: There is no abdominal tenderness.   Musculoskeletal:      Right lower leg: Edema present.      Left lower leg: Edema present.   Neurological:      Mental Status: She is alert and oriented to person, place, and time.   Psychiatric:         Behavior:  Behavior normal.         WBC   Date Value Ref Range Status   08/04/2014 4.62 3.90 - 12.70 K/uL Final     Hemoglobin   Date Value Ref Range Status   08/04/2014 12.7 12.0 - 16.0 g/dL Final     Hematocrit   Date Value Ref Range Status   08/04/2014 39.6 37.0 - 48.5 % Final     Platelets   Date Value Ref Range Status   08/04/2014 217 150 - 350 K/uL Final     BUN   Date Value Ref Range Status   08/04/2014 14 8 - 23 mg/dL Final     Creatinine   Date Value Ref Range Status   08/04/2014 1.0 0.5 - 1.4 mg/dL Final     Glucose   Date Value Ref Range Status   08/04/2014 96 70 - 110 mg/dL Final     Calcium   Date Value Ref Range Status   08/04/2014 9.2 8.7 - 10.5 mg/dL Final     Sodium   Date Value Ref Range Status   08/04/2014 143 136 - 145 mmol/L Final     Potassium   Date Value Ref Range Status   08/04/2014 3.5 3.5 - 5.1 mmol/L Final     Chloride   Date Value Ref Range Status   08/04/2014 108 95 - 110 mmol/L Final     AST   Date Value Ref Range Status   08/04/2014 26 10 - 40 U/L Final     ALT   Date Value Ref Range Status   08/04/2014 18 10 - 44 U/L Final     Alkaline Phosphatase   Date Value Ref Range Status   08/04/2014 90 55 - 135 U/L Final     Total Bilirubin   Date Value Ref Range Status   08/04/2014 0.9 0.1 - 1.0 mg/dL Final     Comment:     For infants and newborns, interpretation of results should be based  on gestational age, weight and in agreement with clinical  observations.  Premature Infant recommended reference ranges:  Up to 24 hours.............<8.0 mg/dL  Up to 48 hours............<12.0 mg/dL  3-5 days..................<15.0 mg/dL  6-29 days.................<15.0 mg/dL       Albumin   Date Value Ref Range Status   08/04/2014 3.8 3.5 - 5.2 g/dL Final     No results found for: INR  Lab Results   Component Value Date    SIROLIMUSLEV 6.6 08/04/2014           Assessment/Plan:     1. Immunosuppression    2. Liver transplanted    3. Pain of left lower extremity        Immunosuppression  -Continue current IS    Liver  transplant-good allograft function  -Continue with routine lab monitoring  -Continue with PCP f/u  -Cancer screening- patient advised about increased risks of cancer and need for skin protection, skin exam and regular age appropriate cancer screening    Left leg pain  -Advised that it is ok to take a limited course fo NSAIDs for 3 days  -Recommend she continue to f/u with her local doctors about this pain as more eval and management may be needed    RTC in 1 year can be via virtual visit.    Patient was seen in the liver transplant department at [facility].    Angela Lopes MD           [facility] Patient Status  Functional Status: 70% - Cares for self: unable to carry on normal activity or active work  Physical Capacity: Limited Mobility

## 2022-09-06 NOTE — PROGRESS NOTES
Clinic Note    Reason for visit:  The primary encounter diagnosis was Gastroesophageal reflux disease, unspecified whether esophagitis present. Diagnoses of Status post liver transplant and Screening for colon cancer were also pertinent to this visit.    PCP: [facility]   [address] / [place] [address]    HPI:  This is a 77 y.o. female who is being seen for a follow up. Patient reports overall doing well. She states diarrhea intermittently that has been since liver transplant but it is not often. Followed by Angela Lopes. Patient reports intermittent heartburn, has taken panto 40 prn that works well to control it. She had negative cologuard 9/4/2019 that was negative. Denies any blood in stool.     EGD 8/28/2019: DBx nl, GBx mild chr gastritis w/o Hp    Old chart:  Anemia: mild (9/2020 Hb 11.9), MCV normal, lifelong, no overt blood loss, work up otherwise normal. Hb electrophoresis nl.  Heartburn: infrequent Sx, PRN ome 40 and infrequent use.   Gastroesophageal Reflux Disease (GERD)  Liver transplant recipient: followed by [facility] [place] [place] once a year, on Rapamune, blood work every 3 months, patient following with Dr. Angela Lopes in [place]  Screening for malignant neoplasms of colon: high risk given chronic immunosuppression, last colonoscopy 2011, declines colonoscopy, offered Cologuard and agrees and if positive will do colonoscopy, negative 9/2019, due 2022  Viral hepatitis C: 3/2017 VL negative  Bloating symptom: likely maldigestion related, given low FODMAP handout to help identify trigger foods    Review of Systems   Constitutional:  Negative for chills, diaphoresis, fatigue, fever and unexpected weight change.   HENT:  Negative for mouth sores, nosebleeds, postnasal drip, sore throat, trouble swallowing and voice change.    Eyes:  Negative for pain, discharge and eye dryness.   Respiratory:  Positive for cough. Negative for apnea, choking, chest tightness, shortness of  breath and wheezing.    Cardiovascular:  Negative for chest pain, palpitations, leg swelling and claudication.   Gastrointestinal:  Positive for diarrhea. Negative for abdominal distention, abdominal pain, anal bleeding, blood in stool, change in bowel habit, constipation, nausea, rectal pain, vomiting, reflux, fecal incontinence and change in bowel habit.   Genitourinary:  Positive for bladder incontinence. Negative for difficulty urinating, dysuria, flank pain, frequency and hematuria.   Musculoskeletal:  Negative for arthralgias, back pain, joint swelling and joint deformity.   Integumentary:  Negative for color change, rash and wound.   Allergic/Immunologic: Negative for environmental allergies and food allergies.   Neurological:  Negative for seizures, facial asymmetry, speech difficulty, weakness, headaches and memory loss.   Hematological:  Negative for adenopathy. Does not bruise/bleed easily.   Psychiatric/Behavioral:  Negative for agitation, behavioral problems, confusion, hallucinations and sleep disturbance.       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    cryptogenic    Migraine headache     Need for prophylactic immunotherapy     Osteopenia     Skin cancer      Past Surgical History:   Procedure Laterality Date    HYSTERECTOMY  1972    LIVER TRANSPLANT  12/5/1993    OOPHORECTOMY  1968     Family History   Problem Relation Age of Onset    Coronary artery disease Mother 73    Coronary artery disease Father 62    Stomach cancer Brother 82    Stomach cancer Son     Stomach cancer Daughter      Social History     Tobacco Use    Smoking status: Never    Smokeless tobacco: Never   Substance Use Topics    Alcohol use: No    Drug use: Never     Review of patient's allergies indicates:   Allergen  "Reactions    Azithromycin Diarrhea and Nausea Only        Medication List with Changes/Refills   Current Medications    ATORVASTATIN (LIPITOR) 20 MG TABLET        CHOLECALCIFEROL, VITAMIN D3, 125 MCG (5,000 UNIT) TAB    Vitamin D    IRON-VITAMIN C 100-250 MG, ICAR-C, 100-250 MG TAB    Take 1 tablet by mouth once daily.    LEVOCETIRIZINE (XYZAL) 5 MG TABLET    Take 5 mg by mouth once daily.    LEVOTHYROXINE (SYNTHROID) 75 MCG TABLET    Take 1 tablet (75 mcg total) by mouth once daily.    LISINOPRIL 10 MG TABLET    Take 10 mg by mouth once daily.    LORAZEPAM (ATIVAN) 0.5 MG TABLET    Take 1 tablet (0.5 mg total) by mouth every 12 (twelve) hours as needed for Anxiety.    MAGNESIUM 200 MG TAB    magnesium    MONTELUKAST (SINGULAIR) 10 MG TABLET    TK 1 T PO  QD    MULTIVITAMIN CAPSULE    Take 1 capsule by mouth once daily.    NASACORT ALLERGY 55 MCG NASAL INHALER    1 spray once daily.    NIACIN 250 MG TAB    Take 250 mg by mouth nightly.    ONDANSETRON (ZOFRAN) IVPB        PROPRANOLOL (INDERAL LA) 120 MG 24 HR CAPSULE    120 mg once daily.    SIROLIMUS (RAPAMUNE) 1 MG TAB    TAKE 2 TABLETS(2 MG) BY MOUTH EVERY DAY    TRAMADOL (ULTRAM) 50 MG TABLET    Take 50 mg by mouth once daily.    VARICELLA-ZOSTER GE-AS01B, PF, (SHINGRIX, PF,) 50 MCG/0.5 ML INJECTION    Administer two doses 2 months apart   Changed and/or Refilled Medications    Modified Medication Previous Medication    PANTOPRAZOLE (PROTONIX) 40 MG TABLET pantoprazole (PROTONIX) 40 MG tablet       Take 1 tablet (40 mg total) by mouth once daily.    Take 1 tablet by mouth 2 (two) times daily.         Vital Signs:  BP (!) 133/56   Pulse (!) 56   Ht 5' 5" (1.651 m)   Wt 65.3 kg (144 lb)   BMI 23.96 kg/m²         Physical Exam  Vitals reviewed.   Constitutional:       General: She is awake. She is not in acute distress.     Appearance: Normal appearance. She is well-developed. She is not ill-appearing, toxic-appearing or diaphoretic.   HENT:      Head: " Normocephalic and atraumatic.      Nose: Nose normal.      Mouth/Throat:      Mouth: Mucous membranes are moist.      Pharynx: Oropharynx is clear. No oropharyngeal exudate or posterior oropharyngeal erythema.   Eyes:      General: Lids are normal. Gaze aligned appropriately. No scleral icterus.        Right eye: No discharge.         Left eye: No discharge.      Extraocular Movements: Extraocular movements intact.      Conjunctiva/sclera: Conjunctivae normal.   Neck:      Trachea: Trachea normal.   Cardiovascular:      Rate and Rhythm: Normal rate and regular rhythm.      Pulses:           Radial pulses are 2+ on the right side and 2+ on the left side.   Pulmonary:      Effort: Pulmonary effort is normal. No respiratory distress.      Breath sounds: Normal breath sounds. No stridor. No wheezing or rhonchi.   Chest:      Chest wall: No tenderness.   Abdominal:      General: A surgical scar is present. Bowel sounds are normal. There is no distension.      Palpations: Abdomen is soft. There is no fluid wave, hepatomegaly or mass.      Tenderness: There is no abdominal tenderness. There is no guarding or rebound.          Comments: Well healed surgical incisions   Musculoskeletal:         General: No tenderness or deformity.      Cervical back: Full passive range of motion without pain and neck supple. No tenderness.      Right lower leg: No edema.      Left lower leg: No edema.   Lymphadenopathy:      Cervical: No cervical adenopathy.   Skin:     General: Skin is warm and dry.      Capillary Refill: Capillary refill takes less than 2 seconds.      Coloration: Skin is not cyanotic, jaundiced or pale.      Findings: No rash.   Neurological:      General: No focal deficit present.      Mental Status: She is alert and oriented to person, place, and time.      Cranial Nerves: No facial asymmetry.      Motor: No tremor.   Psychiatric:         Attention and Perception: Attention normal.         Mood and Affect: Mood and  affect normal.         Speech: Speech normal.         Behavior: Behavior normal. Behavior is cooperative.          All of the data above and below has been reviewed by myself and any further interpretations will be reflected in the assessment and plan.   The data includes review of external notes, and independent interpretation of lab results, procedures, x-rays, and imaging reports.      Assessment:  Gastroesophageal reflux disease, unspecified whether esophagitis present  -     pantoprazole (PROTONIX) 40 MG tablet; Take 1 tablet (40 mg total) by mouth once daily.  Dispense: 90 tablet; Refill: 1    Status post liver transplant    Screening for colon cancer  -     Cologuard Screening (Multitarget Stool DNA); Future; Expected date: 09/07/2022    Discussed R/B/A of colonoscopy for screening. Patient opts for Cologuard and will proceed with colonoscopy if positive. Understands she is high risk due to being on chronic immunosuppressants.     Recommendations:  Proceed with Cologuard.   May take pantoprazole 40 mg daily as needed    Risks, benefits, and alternatives of medical management, any associated procedures, and/or treatment discussed with the patient. Patient given opportunity to ask questions and voices understanding. Patient has elected to proceed with the recommended care modalities as discussed.    Follow up in about 3 years (around 9/7/2025).    Order summary:  Orders Placed This Encounter   Procedures    Cologuard Screening (Multitarget Stool DNA)        Instructed patient to notify my office if they have not been contacted within two weeks after any procedures, submitting any samples (biopsies, blood, stool, urine, etc.) or after any imaging (X-ray, CT, MRI, etc.).     Amanda Bryan MD    This document may have been created using a voice recognition transcribing system. Incorrect words or phrases may have been missed during proofreading. Please interpret accordingly or contact me for clarification.

## 2022-09-07 ENCOUNTER — OFFICE VISIT (OUTPATIENT)
Dept: GASTROENTEROLOGY | Facility: CLINIC | Age: 77
End: 2022-09-07
Payer: MEDICARE

## 2022-09-07 VITALS
HEIGHT: 65 IN | WEIGHT: 144 LBS | SYSTOLIC BLOOD PRESSURE: 133 MMHG | HEART RATE: 56 BPM | DIASTOLIC BLOOD PRESSURE: 56 MMHG | BODY MASS INDEX: 23.99 KG/M2

## 2022-09-07 DIAGNOSIS — Z12.11 SCREENING FOR COLON CANCER: ICD-10-CM

## 2022-09-07 DIAGNOSIS — K21.9 GASTROESOPHAGEAL REFLUX DISEASE, UNSPECIFIED WHETHER ESOPHAGITIS PRESENT: Primary | ICD-10-CM

## 2022-09-07 DIAGNOSIS — Z94.4 STATUS POST LIVER TRANSPLANT: ICD-10-CM

## 2022-09-07 PROCEDURE — 99213 OFFICE O/P EST LOW 20 MIN: CPT | Mod: S$GLB,,, | Performed by: INTERNAL MEDICINE

## 2022-09-07 PROCEDURE — 99213 PR OFFICE/OUTPT VISIT, EST, LEVL III, 20-29 MIN: ICD-10-PCS | Mod: S$GLB,,, | Performed by: INTERNAL MEDICINE

## 2022-09-07 RX ORDER — LEVOCETIRIZINE DIHYDROCHLORIDE 5 MG/1
5 TABLET, FILM COATED ORAL DAILY
COMMUNITY
Start: 2022-05-06

## 2022-09-07 RX ORDER — TRAMADOL HYDROCHLORIDE 50 MG/1
50 TABLET ORAL DAILY
COMMUNITY
Start: 2022-08-16 | End: 2024-01-23

## 2022-09-07 RX ORDER — LISINOPRIL 10 MG/1
10 TABLET ORAL DAILY
COMMUNITY
Start: 2022-08-16 | End: 2024-02-27

## 2022-09-07 RX ORDER — TRIAMCINOLONE ACETONIDE 55 UG/1
1 SPRAY, METERED NASAL DAILY
COMMUNITY
Start: 2022-03-16 | End: 2024-01-23

## 2022-09-07 RX ORDER — PANTOPRAZOLE SODIUM 40 MG/1
40 TABLET, DELAYED RELEASE ORAL DAILY
Qty: 90 TABLET | Refills: 1 | Status: SHIPPED | OUTPATIENT
Start: 2022-09-07 | End: 2022-12-14

## 2022-09-07 RX ORDER — IRON,CARBONYL/ASCORBIC ACID 100-250 MG
1 TABLET ORAL DAILY
COMMUNITY
Start: 2022-03-17

## 2022-09-07 NOTE — LETTER
September 7, 2022        Joshua Schulte MD  [facility] [address] [place] [address]             [facility] [facility]  [address] [place] [address]  Phone: [phone]  Fax: [phone]   Patient: Sheri Bates   MR Number: [id number]   YOB: 1945   Date of Visit: 9/7/2022       Dear Dr. Schulte:    Thank you for referring Sheri Bates to me for evaluation. Attached you will find relevant portions of my assessment and plan of care.    If you have questions, please do not hesitate to call me. I look forward to following Sheri Bates along with you.    Sincerely,      Amanda Bryan MD            CC  No Recipients    Enclosure

## 2022-09-07 NOTE — PATIENT INSTRUCTIONS
Proceed with Cologuard.   May take pantoprazole 40 mg daily as needed.    Please notify my office if you have not been contacted within two weeks after any procedures, submitting any samples (biopsies, blood, stool, urine, etc.) or after any imaging (X-ray, CT, MRI, etc.).

## 2022-09-17 ENCOUNTER — TELEPHONE (OUTPATIENT)
Dept: GASTROENTEROLOGY | Facility: CLINIC | Age: 77
End: 2022-09-17
Payer: MEDICARE

## 2022-09-17 LAB — NONINV COLON CA DNA+OCC BLD SCRN STL QL: NEGATIVE

## 2022-09-17 NOTE — TELEPHONE ENCOUNTER
Cologuard neg.  Notify patient. Repeat it in 3 years or okay to schedule screening colonoscopy if she changes her decision. Make OV with me in 3 years.  NBP

## 2022-09-23 ENCOUNTER — TELEPHONE (OUTPATIENT)
Dept: TRANSPLANT | Facility: CLINIC | Age: 77
End: 2022-09-23
Payer: MEDICARE

## 2022-09-23 NOTE — TELEPHONE ENCOUNTER
Patient notified and instructed as per Pharm D with recs.  Also as per  it's fine if her local doctor prescribes it.

## 2022-09-23 NOTE — TELEPHONE ENCOUNTER
----- Message from Kathy Rankin PharmD sent at 9/23/2022  3:24 PM CDT -----  From a medication standpoint, I think it is reasonable to try, but please keep in mind a few things:    1 - we do not recommend any type of smoked or inhaled marijuana due to increased risk of infection   2 - if she starts oral marijuana - we would obviously recommend that it is from a place that is reputable, etc.  Systemic THC also has the potential to increase tacrolimus levels, and I would presume the same would be true for sirolimus.  So if she does start on medical marijuana consistency may be important and I would recommend checking a sirolimus level a week after starting  3 - any topical THC product would be fine        ----- Message -----  From: Porsche Corcoran  Sent: 9/23/2022   1:53 PM CDT  To: Angela Lopes MD, #    Patient asking if ok to take Medical Marijuana for her arthritis in both knees. She said she is scheduled to see the ortho/rheum. On 9/30. To discuss.  Thanks.

## 2022-09-23 NOTE — TELEPHONE ENCOUNTER
"Returned patient call; she asked if ok to take " Medical Marijuana for my arthritis in both knees". Informed her I will review with MD/PharmD and let her know.   "

## 2022-09-23 NOTE — TELEPHONE ENCOUNTER
----- Message from Angela Lopes MD sent at 9/23/2022  3:25 PM CDT -----  Yes that's fine  ----- Message -----  From: Porsche Corcoran  Sent: 9/23/2022   1:53 PM CDT  To: Angela Lopes MD, #    Patient asking if ok to take Medical Marijuana for her arthritis in both knees. She said she is scheduled to see the ortho/rheum. On 9/30. To discuss.  Thanks.

## 2022-10-08 ENCOUNTER — PATIENT MESSAGE (OUTPATIENT)
Dept: HEPATOLOGY | Facility: CLINIC | Age: 77
End: 2022-10-08
Payer: MEDICARE

## 2022-10-09 ENCOUNTER — PATIENT MESSAGE (OUTPATIENT)
Dept: HEPATOLOGY | Facility: CLINIC | Age: 77
End: 2022-10-09
Payer: MEDICARE

## 2022-10-10 ENCOUNTER — PATIENT MESSAGE (OUTPATIENT)
Dept: TRANSPLANT | Facility: CLINIC | Age: 77
End: 2022-10-10
Payer: MEDICARE

## 2022-10-14 LAB
EXT ALBUMIN: 4
EXT ALKALINE PHOSPHATASE: 70
EXT ALT: 10
EXT AST: 22
EXT BASOPHIL%: 0.8
EXT BILIRUBIN TOTAL: 0.51
EXT BUN: 23
EXT CALCIUM: 9.5
EXT CHLORIDE: 107
EXT CO2: 31
EXT CREATININE: 0.95 MG/DL
EXT EOSINOPHIL%: 1.7
EXT GLUCOSE: 98
EXT HEMATOCRIT: 34.5
EXT HEMOGLOBIN: 10.9
EXT LYMPH%: 38.9
EXT MONOCYTES%: 10.4
EXT PLATELETS: 228
EXT POTASSIUM: 4.4
EXT PROTEIN TOTAL: 7.2
EXT SEGS%: 47.8
EXT SIROLIMUS LVL: 5.7
EXT SODIUM: 140 MMOL/L
EXT WBC: 4.83

## 2022-11-11 ENCOUNTER — TELEPHONE (OUTPATIENT)
Dept: TRANSPLANT | Facility: CLINIC | Age: 77
End: 2022-11-11
Payer: MEDICARE

## 2022-11-11 NOTE — LETTER
November 11, 2022    Sheri Bates  [address]  [place] [address]          Dear Sheri Bates:  MRN: [id number]    This is a follow up to your recent labs, your lab results were stable.  There are no medicine changes.  Please have your labs drawn again on 1/9/23.      If you cannot have your labs drawn on the scheduled date, it is your responsibility to call the transplant department to reschedule.  Please call [phone] and ask to speak to [facility]  for all scheduling requests.     Sincerely,        Your Liver Transplant Coordinator    [facility]  [address] [place] [phone]

## 2022-11-11 NOTE — TELEPHONE ENCOUNTER
Labs reviewed and are stable, continue q 3 months. Letter sent.       ----- Message from Angela Lopes MD sent at 11/10/2022  4:30 PM CST -----  Reviewed, nothing to do; repeat per routine

## 2022-12-20 ENCOUNTER — TELEPHONE (OUTPATIENT)
Dept: GASTROENTEROLOGY | Facility: CLINIC | Age: 77
End: 2022-12-20
Payer: MEDICARE

## 2023-01-13 LAB
EXT CHOLESTEROL: 210
EXT HDL: 34.3
EXT LDL CHOLESTEROL: 135
EXT TRIGLYCERIDES: 204

## 2023-01-17 ENCOUNTER — TELEPHONE (OUTPATIENT)
Dept: TRANSPLANT | Facility: CLINIC | Age: 78
End: 2023-01-17
Payer: MEDICARE

## 2023-01-17 NOTE — TELEPHONE ENCOUNTER
----- Message from Angela Lopes MD sent at 1/15/2023  1:07 PM CST -----  Cholesterol is slight elevated recommend PCP follow up to see what's needed

## 2023-02-01 ENCOUNTER — TELEPHONE (OUTPATIENT)
Dept: TRANSPLANT | Facility: CLINIC | Age: 78
End: 2023-02-01
Payer: MEDICARE

## 2023-02-01 NOTE — TELEPHONE ENCOUNTER
----- Message from Milena Hubbard sent at 2/1/2023  1:03 PM CST -----  Regarding: Shingles medication advice  The patient called requesting to speak to Nurse Porsche Pizano went to urgent care yesterday, diagnosed we/shingles and wants to discuss  the medication they prescribed    Patient can be contacted @# [phone] (home)

## 2023-02-01 NOTE — TELEPHONE ENCOUNTER
----- Message from Milena Hubbard sent at 2/1/2023  1:03 PM CST -----  Regarding: Shingles medication advice  The patient called requesting to speak to Nurse Porsche Pizano went to urgent care yesterday, diagnosed we/shingles and wants to discuss  the medication they prescribed    Patient can be contacted @# [phone] (home)

## 2023-02-01 NOTE — TELEPHONE ENCOUNTER
Returned patient call: she reported she has been diagnosed with Shingles and placed on Valacylovir.  Informed her ok to take.

## 2023-02-13 LAB
EXT ALBUMIN: 4.04
EXT ALKALINE PHOSPHATASE: 68.7
EXT ALT: 16.6
EXT AST: 23.7
EXT BASOPHIL%: 0.7
EXT BILIRUBIN TOTAL: 0.5
EXT BUN: 24
EXT CALCIUM: 9.51
EXT CHLORIDE: 106
EXT CHOLESTEROL: 149
EXT CO2: 29.9
EXT CREATININE: 0.91 MG/DL
EXT EOSINOPHIL%: 2.6
EXT GFR MDRD NON AF AMER: 60
EXT GLUCOSE: 102
EXT HDL: 47.4
EXT HEMATOCRIT: 34
EXT HEMOGLOBIN: 11.2
EXT LDL CHOLESTEROL: 79
EXT LYMPH%: 39.8
EXT MONOCYTES%: 11.3
EXT PLATELETS: 246
EXT POTASSIUM: 4.27
EXT PROTEIN TOTAL: 7.3
EXT SEGS%: 45.4
EXT SIROLIMUS LVL: 6.4
EXT SODIUM: 141.1 MMOL/L
EXT TRIGLYCERIDES: 114
EXT WBC: 4.17

## 2023-02-15 ENCOUNTER — TELEPHONE (OUTPATIENT)
Dept: TRANSPLANT | Facility: CLINIC | Age: 78
End: 2023-02-15
Payer: MEDICARE

## 2023-02-15 NOTE — TELEPHONE ENCOUNTER
----- Message from Angela Lopes MD sent at 2/15/2023  1:43 PM CST -----  Reviewed, nothing to do; repeat per routine

## 2023-03-10 ENCOUNTER — TELEPHONE (OUTPATIENT)
Dept: GASTROENTEROLOGY | Facility: CLINIC | Age: 78
End: 2023-03-10
Payer: MEDICARE

## 2023-03-10 VITALS — BODY MASS INDEX: 23.99 KG/M2 | WEIGHT: 144 LBS | HEIGHT: 65 IN

## 2023-03-10 DIAGNOSIS — R10.9 ABDOMINAL PAIN, UNSPECIFIED ABDOMINAL LOCATION: ICD-10-CM

## 2023-03-10 DIAGNOSIS — K92.1 MELENA: Primary | ICD-10-CM

## 2023-03-10 DIAGNOSIS — R11.0 NAUSEA: ICD-10-CM

## 2023-03-10 NOTE — TELEPHONE ENCOUNTER
"[facility]  [address] [place] [address]  Phone: [phone]  Fax: [phone]    History & Physical         Provider: Dr. Amanda Bryan    Patient Name: Sheri LAKE (age):1945  78 y.o.           Gender: female   Phone: [phone]     Referring Physician: [facility]     Vital Signs:   Height - 5' 5"  Weight - 144 lb  BMI -  24    Plan: EGD     Encounter Diagnoses   Name Primary?    Melena Yes    Abdominal pain, unspecified abdominal location     Nausea            History:      Past Medical History:   Diagnosis Date    Basal cell carcinoma     Encounter for long-term (current) use of other medications     History of skin cancer 2018    Hyperlipidemia     Hypertension     Hypothyroidism     Liver transplanted 1993    cryptogenic    Migraine headache     Need for prophylactic immunotherapy     Osteopenia     Skin cancer       Past Surgical History:   Procedure Laterality Date    HYSTERECTOMY  1972    LIVER TRANSPLANT  1993    OOPHORECTOMY  1968    skin cancer removal Left     Left arm      Medication List with Changes/Refills   Current Medications    ATORVASTATIN (LIPITOR) 20 MG TABLET        CHOLECALCIFEROL, VITAMIN D3, 125 MCG (5,000 UNIT) TAB    Vitamin D    IRON-VITAMIN C 100-250 MG, ICAR-C, 100-250 MG TAB    Take 1 tablet by mouth once daily.    LEVOCETIRIZINE (XYZAL) 5 MG TABLET    Take 5 mg by mouth once daily.    LEVOTHYROXINE (SYNTHROID) 75 MCG TABLET    Take 1 tablet (75 mcg total) by mouth once daily.    LISINOPRIL 10 MG TABLET    Take 10 mg by mouth once daily.    LORAZEPAM (ATIVAN) 0.5 MG TABLET    Take 1 tablet (0.5 mg total) by mouth every 12 (twelve) hours as needed for Anxiety.    MAGNESIUM 200 MG TAB    magnesium    MONTELUKAST (SINGULAIR) 10 MG TABLET    TK 1 T PO  QD    MULTIVITAMIN CAPSULE    Take 1 capsule by mouth once daily.    " NASACORT ALLERGY 55 MCG NASAL INHALER    1 spray once daily.    NIACIN 250 MG TAB    Take 250 mg by mouth nightly.    ONDANSETRON (ZOFRAN) IVPB        PANTOPRAZOLE (PROTONIX) 40 MG TABLET    Take 1 tablet (40 mg total) by mouth once daily.    PROPRANOLOL (INDERAL LA) 120 MG 24 HR CAPSULE    120 mg once daily.    SIROLIMUS (RAPAMUNE) 1 MG TAB    TAKE 2 TABLETS ONCE DAILY    TRAMADOL (ULTRAM) 50 MG TABLET    Take 50 mg by mouth once daily.    VARICELLA-ZOSTER GE-AS01B, PF, (SHINGRIX, PF,) 50 MCG/0.5 ML INJECTION    Administer two doses 2 months apart      Review of patient's allergies indicates:   Allergen Reactions    Azithromycin Diarrhea and Nausea Only      Family History   Problem Relation Age of Onset    Coronary artery disease Mother 73    Coronary artery disease Father 62    Stomach cancer Brother 82    Stomach cancer Son     Stomach cancer Daughter       Social History     Tobacco Use    Smoking status: Never    Smokeless tobacco: Never   Substance Use Topics    Alcohol use: No    Drug use: Never        Physical Examination:     General Appearance:___________________________  HEENT: _____________________________________  Abdomen:____________________________________  Heart:________________________________________  Lungs:_______________________________________  Extremities:___________________________________  Skin:_________________________________________  Endocrine:____________________________________  Genitourinary:_________________________________  Neurological:__________________________________      Patient has been evaluated immediately prior to sedation and is medically cleared for endoscopy with IVCS as an ASA class: ______      Physician Signature: _________________________       Date: ________  Time: ________

## 2023-03-10 NOTE — TELEPHONE ENCOUNTER
Pt informed. Lab orders faxed to [facility] per pt request.  Message sent to add pt to MARY burns for 3/28/23.

## 2023-03-10 NOTE — TELEPHONE ENCOUNTER
Okay to schedule patient for an EGD at [facility] for melena. Add on to 3/28/2023.  If she develops any symptoms of anemia (light-headed, dizzy, weak, or any other concerning Sx then she should present to the ER). Recommend blood work including her Rapamune level check as well. Best to collect the blood samples 30 minutes to one hour before her routine oral dose. Orders signed.  NBP

## 2023-03-10 NOTE — TELEPHONE ENCOUNTER
----- Message from Verónica Espinoza LPN sent at 3/9/2023 12:09 PM CST -----  Regarding: FW: medical advise  Contact: patient  I spoke to patient, she had a liver transplant in 1993.  The last 5 days she has had nausea, stomach pain, soft black stools, and no appetite. Requesting an appt.  ----- Message -----  From: Lydia Sherman  Sent: 3/9/2023  12:06 PM CST  To: [facility] Staff  Subject: medical advise                                   PT states she had a transplant in 93 but the provider did a procedure on her and that may be why she is needing to be seen, her stools are black and it has been going on for 5 days, return call [phone]

## 2023-03-21 ENCOUNTER — TELEPHONE (OUTPATIENT)
Dept: GASTROENTEROLOGY | Facility: CLINIC | Age: 78
End: 2023-03-21
Payer: MEDICARE

## 2023-03-21 NOTE — TELEPHONE ENCOUNTER
Orders were faxe over to [facility] lab.     ----- Message from Christy Jang sent at 3/21/2023 10:01 AM CDT -----  Contact: Pt  States she's calling rg having [facility] and pt can be reached at [phone]/thanks/dbw

## 2023-03-28 ENCOUNTER — OUTSIDE PLACE OF SERVICE (OUTPATIENT)
Dept: GASTROENTEROLOGY | Facility: CLINIC | Age: 78
End: 2023-03-28
Payer: MEDICARE

## 2023-03-28 PROCEDURE — 43239 EGD BIOPSY SINGLE/MULTIPLE: CPT | Mod: ,,, | Performed by: INTERNAL MEDICINE

## 2023-03-28 PROCEDURE — 43239 PR EGD, FLEX, W/BIOPSY, SGL/MULTI: ICD-10-PCS | Mod: ,,, | Performed by: INTERNAL MEDICINE

## 2023-03-29 LAB — SPECIMEN TO PATHOLOGY: NORMAL

## 2023-04-09 ENCOUNTER — TELEPHONE (OUTPATIENT)
Dept: GASTROENTEROLOGY | Facility: CLINIC | Age: 78
End: 2023-04-09
Payer: MEDICARE

## 2023-04-09 NOTE — TELEPHONE ENCOUNTER
3/21/2023 CMP/lip/INR nl, Hb 11.9L, MCV 88, CBC onl.  Sirolimus 5.2 (blood drawn a few hours after taking medicine).  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    Notify patient. No signs of infection, precancerous cells or Celiac disease on the upper endoscopy biopsies.  Her blood results look well.  I rec a one year follow up OV with me and notify me sooner if any issues.  NBP

## 2023-05-26 LAB
EXT ALBUMIN: 3.9
EXT ALKALINE PHOSPHATASE: 76
EXT ALT: 17
EXT AST: 24
EXT BASOPHIL%: 0.6
EXT BILIRUBIN TOTAL: 0.53
EXT BUN: 20
EXT CALCIUM: 9.25
EXT CHLORIDE: 107
EXT CHOLESTEROL: 164
EXT CO2: 29.7
EXT CREATININE: 0.93 MG/DL
EXT EOSINOPHIL%: 1.5
EXT GFR MDRD NON AF AMER: 63
EXT GLUCOSE: 99
EXT HDL: 40.7
EXT HEMATOCRIT: 35.3
EXT HEMOGLOBIN: 11.2
EXT LDL CHOLESTEROL: 95
EXT LYMPH%: 37.5
EXT MONOCYTES%: 10.5
EXT PLATELETS: 232
EXT POTASSIUM: 3.8
EXT PROTEIN TOTAL: 6.9
EXT SEGS%: 49.3
EXT SIROLIMUS LVL: 4.8
EXT SODIUM: 140 MMOL/L
EXT TRIGLYCERIDES: 140
EXT WBC: 5.26

## 2023-05-29 ENCOUNTER — TELEPHONE (OUTPATIENT)
Dept: TRANSPLANT | Facility: CLINIC | Age: 78
End: 2023-05-29
Payer: MEDICARE

## 2023-06-07 NOTE — TELEPHONE ENCOUNTER
----- Message from Angela Lopes MD sent at 1/2/2019  1:11 PM CST -----  Reviewed, nothing to do; repeat per routine     Valtrex Counseling: I discussed with the patient the risks of valacyclovir including but not limited to kidney damage, nausea, vomiting and severe allergy.  The patient understands that if the infection seems to be worsening or is not improving, they are to call.

## 2023-08-09 ENCOUNTER — TELEPHONE (OUTPATIENT)
Dept: TRANSPLANT | Facility: CLINIC | Age: 78
End: 2023-08-09
Payer: MEDICARE

## 2023-08-21 ENCOUNTER — TELEPHONE (OUTPATIENT)
Dept: TRANSPLANT | Facility: CLINIC | Age: 78
End: 2023-08-21
Payer: MEDICARE

## 2023-08-29 LAB
EXT ALBUMIN: 3.8
EXT ALKALINE PHOSPHATASE: 83
EXT ALT: 15
EXT AST: 26
EXT BASOPHIL%: 0.1
EXT BILIRUBIN TOTAL: 0.8
EXT BUN: 26
EXT CALCIUM: 9.2
EXT CHLORIDE: 101
EXT CHOLESTEROL: 219
EXT CO2: 34
EXT CREATININE: 1.2 MG/DL
EXT EOSINOPHIL%: 1.5
EXT GFR MDRD NON AF AMER: 46
EXT GLUCOSE: 110
EXT HDL: 39
EXT HEMATOCRIT: 35.1
EXT HEMOGLOBIN: 11.5
EXT LDL CHOLESTEROL: 109
EXT LYMPH%: 3
EXT MONOCYTES%: 11.4
EXT PLATELETS: 220
EXT POTASSIUM: 3.7
EXT PROTEIN TOTAL: 7.3
EXT SEGS%: 51.8
EXT SIROLIMUS LVL: 16.7
EXT SODIUM: 140 MMOL/L
EXT TRIGLYCERIDES: 355
EXT WBC: 5.87

## 2023-09-01 ENCOUNTER — PATIENT MESSAGE (OUTPATIENT)
Dept: TRANSPLANT | Facility: CLINIC | Age: 78
End: 2023-09-01
Payer: MEDICARE

## 2023-09-01 ENCOUNTER — TELEPHONE (OUTPATIENT)
Dept: TRANSPLANT | Facility: CLINIC | Age: 78
End: 2023-09-01
Payer: MEDICARE

## 2023-09-01 NOTE — TELEPHONE ENCOUNTER
Patient notified and instructed via [facility]:    Per ([facility]): Liver tests ok, cholesterol elevated so she needs to follow up with her Primary Care Physician.  Sirolimus level is high but seems like it was not a true trough. Repeat labs in 2 weeks - due 9/15/23.  Thanks.

## 2023-09-01 NOTE — TELEPHONE ENCOUNTER
----- Message from Angela العلي MD sent at 9/1/2023 11:41 AM CDT -----  Liver tests ok, cholesterol elevated so she needs to follow up with her PCP. Sirolimus level is high but seems like it was not a true trough. Repeat labs in 2 weeks

## 2023-09-26 ENCOUNTER — PATIENT MESSAGE (OUTPATIENT)
Dept: HEPATOLOGY | Facility: CLINIC | Age: 78
End: 2023-09-26
Payer: MEDICARE

## 2023-10-02 DIAGNOSIS — K21.9 GASTROESOPHAGEAL REFLUX DISEASE, UNSPECIFIED WHETHER ESOPHAGITIS PRESENT: ICD-10-CM

## 2023-10-04 ENCOUNTER — TELEPHONE (OUTPATIENT)
Dept: GASTROENTEROLOGY | Facility: CLINIC | Age: 78
End: 2023-10-04
Payer: MEDICARE

## 2023-10-04 RX ORDER — PANTOPRAZOLE SODIUM 40 MG/1
40 TABLET, DELAYED RELEASE ORAL DAILY
Qty: 90 TABLET | Refills: 0 | Status: SHIPPED | OUTPATIENT
Start: 2023-10-04 | End: 2023-12-20

## 2023-10-05 LAB
EXT ALBUMIN: 4
EXT ALKALINE PHOSPHATASE: 77
EXT ALT: 14
EXT AST: 25
EXT BASOPHIL%: 0
EXT BILIRUBIN TOTAL: 0.7
EXT BUN: 26
EXT CALCIUM: 9.7
EXT CHLORIDE: 103
EXT CO2: 32
EXT CREATININE: 1.1 MG/DL
EXT EOSINOPHIL%: 2.1
EXT GFR MDRD NON AF AMER: 52
EXT GLUCOSE: 107
EXT HEMATOCRIT: 34.5
EXT HEMOGLOBIN: 11
EXT LYMPH%: 38.1
EXT MONOCYTES%: 12.2
EXT PLATELETS: 222
EXT POTASSIUM: 3.7
EXT PROTEIN TOTAL: 7.7
EXT SEGS%: 47
EXT SIROLIMUS LVL: 9.4
EXT SODIUM: 140 MMOL/L
EXT WBC: 4.67

## 2023-10-06 ENCOUNTER — TELEPHONE (OUTPATIENT)
Dept: TRANSPLANT | Facility: CLINIC | Age: 78
End: 2023-10-06
Payer: MEDICARE

## 2023-10-06 NOTE — TELEPHONE ENCOUNTER
----- Message from Angela العلي MD sent at 10/5/2023  5:57 PM CDT -----  Labs are ok repeat in a month

## 2023-12-20 ENCOUNTER — TELEPHONE (OUTPATIENT)
Dept: TRANSPLANT | Facility: CLINIC | Age: 78
End: 2023-12-20
Payer: MEDICARE

## 2023-12-20 DIAGNOSIS — K21.9 GASTROESOPHAGEAL REFLUX DISEASE, UNSPECIFIED WHETHER ESOPHAGITIS PRESENT: ICD-10-CM

## 2023-12-20 RX ORDER — PANTOPRAZOLE SODIUM 40 MG/1
40 TABLET, DELAYED RELEASE ORAL
Qty: 90 TABLET | Refills: 0 | Status: SHIPPED | OUTPATIENT
Start: 2023-12-20 | End: 2024-01-11

## 2023-12-20 NOTE — TELEPHONE ENCOUNTER
Update from ELISEO Dorado , no results received after requests made.  Missed Lab Letter sent to patient.

## 2023-12-26 ENCOUNTER — TELEPHONE (OUTPATIENT)
Dept: TRANSPLANT | Facility: CLINIC | Age: 78
End: 2023-12-26
Payer: MEDICARE

## 2024-01-03 ENCOUNTER — TELEPHONE (OUTPATIENT)
Dept: HEPATOLOGY | Facility: CLINIC | Age: 79
End: 2024-01-03
Payer: MEDICARE

## 2024-01-03 NOTE — TELEPHONE ENCOUNTER
----- Message from Yeny Washington sent at 1/3/2024 11:25 AM CST -----  Contact: Sheri  Patient is calling in regards to lab work. Reports being at [facility] to get lab work however, they haven't received any orders. Please return call back to pt at .[phone].     Fax for orders: [phone]

## 2024-01-03 NOTE — TELEPHONE ENCOUNTER
Returned patient's call and informed her that I would send a message to the staff that faxed her labs. I attempted to help the patient but I didn't see any labs ordered.

## 2024-01-08 ENCOUNTER — TELEPHONE (OUTPATIENT)
Dept: TRANSPLANT | Facility: CLINIC | Age: 79
End: 2024-01-08
Payer: MEDICARE

## 2024-01-08 DIAGNOSIS — K21.9 GASTROESOPHAGEAL REFLUX DISEASE, UNSPECIFIED WHETHER ESOPHAGITIS PRESENT: ICD-10-CM

## 2024-01-08 NOTE — TELEPHONE ENCOUNTER
----- Message from Chacho Denton sent at 1/8/2024  9:03 AM CST -----  Regarding: lab order  Pt would like lab order to be fax to

## 2024-01-09 ENCOUNTER — PATIENT MESSAGE (OUTPATIENT)
Dept: HEPATOLOGY | Facility: CLINIC | Age: 79
End: 2024-01-09
Payer: MEDICARE

## 2024-01-11 RX ORDER — PANTOPRAZOLE SODIUM 40 MG/1
40 TABLET, DELAYED RELEASE ORAL
Qty: 90 TABLET | Refills: 0 | Status: SHIPPED | OUTPATIENT
Start: 2024-01-11 | End: 2024-01-23 | Stop reason: SDUPTHER

## 2024-01-18 ENCOUNTER — TELEPHONE (OUTPATIENT)
Dept: GASTROENTEROLOGY | Facility: CLINIC | Age: 79
End: 2024-01-18
Payer: MEDICARE

## 2024-01-23 ENCOUNTER — OFFICE VISIT (OUTPATIENT)
Dept: GASTROENTEROLOGY | Facility: CLINIC | Age: 79
End: 2024-01-23
Payer: MEDICARE

## 2024-01-23 VITALS
BODY MASS INDEX: 24.06 KG/M2 | WEIGHT: 144.38 LBS | HEART RATE: 68 BPM | HEIGHT: 65 IN | OXYGEN SATURATION: 97 % | DIASTOLIC BLOOD PRESSURE: 70 MMHG | SYSTOLIC BLOOD PRESSURE: 133 MMHG

## 2024-01-23 DIAGNOSIS — D64.9 ANEMIA, UNSPECIFIED TYPE: ICD-10-CM

## 2024-01-23 DIAGNOSIS — Z94.4 STATUS POST LIVER TRANSPLANT: ICD-10-CM

## 2024-01-23 DIAGNOSIS — K21.9 GASTROESOPHAGEAL REFLUX DISEASE, UNSPECIFIED WHETHER ESOPHAGITIS PRESENT: Primary | ICD-10-CM

## 2024-01-23 DIAGNOSIS — R32 URINARY INCONTINENCE, UNSPECIFIED TYPE: ICD-10-CM

## 2024-01-23 PROCEDURE — 99213 OFFICE O/P EST LOW 20 MIN: CPT | Mod: S$GLB,,, | Performed by: INTERNAL MEDICINE

## 2024-01-23 RX ORDER — GABAPENTIN 300 MG/1
CAPSULE ORAL
COMMUNITY
Start: 2024-01-08

## 2024-01-23 RX ORDER — ATORVASTATIN CALCIUM 10 MG/1
10 TABLET, FILM COATED ORAL
COMMUNITY
Start: 2024-01-09 | End: 2024-02-27

## 2024-01-23 RX ORDER — DILTIAZEM HYDROCHLORIDE 90 MG/1
90 TABLET, FILM COATED ORAL 2 TIMES DAILY
COMMUNITY
Start: 2023-11-29

## 2024-01-23 RX ORDER — PANTOPRAZOLE SODIUM 40 MG/1
40 TABLET, DELAYED RELEASE ORAL DAILY
Qty: 90 TABLET | Refills: 4 | Status: SHIPPED | OUTPATIENT
Start: 2024-01-23

## 2024-01-23 RX ORDER — HYDROCHLOROTHIAZIDE 12.5 MG/1
12.5 TABLET ORAL
COMMUNITY
Start: 2023-11-27

## 2024-01-23 RX ORDER — HYDROXYZINE PAMOATE 25 MG/1
25 CAPSULE ORAL NIGHTLY
COMMUNITY
Start: 2024-01-09

## 2024-01-23 RX ORDER — TRIAMCINOLONE ACETONIDE 1 MG/G
CREAM TOPICAL
COMMUNITY
Start: 2024-01-08

## 2024-01-23 NOTE — LETTER
January 23, 2024        [facility]  [address]  [address]  [place] [address]             [facility]  [address] [place] [address]  Phone: [phone]  Fax: [phone]   Patient: Sheri Bates   MR Number: [id number]   YOB: 1945   Date of Visit: 1/23/2024       Dear Dr. Juarez:    Thank you for referring Sheri Bates to me for evaluation. Attached you will find relevant portions of my assessment and plan of care.    If you have questions, please do not hesitate to call me. I look forward to following Sheri Bates along with you.    Sincerely,      Amanda Bryan MD            CC  No Recipients    Enclosure

## 2024-01-23 NOTE — PROGRESS NOTES
Clinic Note    Reason for visit:  The primary encounter diagnosis was Gastroesophageal reflux disease, unspecified whether esophagitis present. Diagnoses of Status post liver transplant, Anemia, unspecified type, and Urinary incontinence, unspecified type were also pertinent to this visit.    PCP: [facility]       HPI:  This is a 78 y.o. female here for follow up. Patient with h/o anemia, h/o HCV s/p liver transplant- Followed by Dr. العلي yearly. She just had labs at [facility] but we do not have these results. She takes pantoprazole 40 mg daily and does well with this.       11/2023: Hgb 10.8L, CBC onl  9/2023: Cr 1.1, CMP nl, Sirolimus 9.4, Hgb 11L    3/21/2023 CMP/lip/INR nl, Hb 11.9L, MCV 88, CBC onl.  Sirolimus 5.2 (blood drawn a few hours after taking medicine).  3/28/2023 EGD smallHH, DBx nl, GBx react w/focal mini chr inact gitis w/o Hp, GEBx reflux    9/2022 Cologuard negative.     Old chart:  Anemia: mild (9/2020 Hb 11.9), MCV normal, lifelong, no overt blood loss, work up otherwise normal. Hb electrophoresis nl.  Heartburn: infrequent Sx, PRN ome 40 and infrequent use.   Gastroesophageal Reflux Disease (GERD)  Liver transplant recipient: followed by [facility] once a year, on Rapamune, blood work every 3 months, patient following with Dr. Angela Lopes in [place]  Screening for malignant neoplasms of colon: high risk given chronic immunosuppression, last colonoscopy 2011, declines colonoscopy, offered Cologuard and agrees and if positive will do colonoscopy, negative 9/2019, due 2022  Viral hepatitis C: 3/2017 VL negative  Bloating symptom: likely maldigestion related, given low FODMAP handout to help identify trigger foods    Review of Systems   Constitutional:  Negative for fatigue, fever and unexpected weight change.   HENT:  Negative for mouth sores, postnasal drip, sore throat and trouble swallowing.    Eyes:  Positive for eye dryness. Negative for pain and discharge.    Respiratory:  Negative for apnea, cough, choking, chest tightness, shortness of breath and wheezing.    Cardiovascular:  Negative for chest pain, palpitations and leg swelling.   Gastrointestinal:  Negative for abdominal distention, abdominal pain, anal bleeding, blood in stool, change in bowel habit, constipation, diarrhea, nausea, rectal pain, vomiting, reflux and fecal incontinence.   Genitourinary:  Positive for bladder incontinence. Negative for dysuria and hematuria.   Musculoskeletal:  Negative for arthralgias, back pain and joint swelling.   Integumentary:  Negative for color change and rash.   Allergic/Immunologic: Negative for environmental allergies and food allergies.   Neurological:  Negative for seizures and headaches.   Hematological:  Negative for adenopathy. Does not bruise/bleed easily.        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    cryptogenic    Migraine headache     Need for prophylactic immunotherapy     Osteopenia     Skin cancer      Past Surgical History:   Procedure Laterality Date    COLONOSCOPY      HYSTERECTOMY  1972    LIVER TRANSPLANT  12/05/1993    OOPHORECTOMY  1968    skin cancer removal Left     Left arm     Family History   Problem Relation Age of Onset    Coronary artery disease Mother 73    Coronary artery disease Father 62    Stomach cancer Brother 82    Stomach cancer Son     Stomach cancer Daughter      Social History     Tobacco Use    Smoking status: Never    Smokeless tobacco: Never   Substance Use Topics    Alcohol use: No    Drug use: Never     Review of patient's allergies indicates:   Allergen Reactions    Azithromycin Diarrhea and Nausea Only        Medication List with Changes/Refills   Current Medications    ATORVASTATIN (LIPITOR) 10 MG TABLET    Take  10 mg by mouth.    CHOLECALCIFEROL, VITAMIN D3, 125 MCG (5,000 UNIT) TAB    Vitamin D    DILTIAZEM (CARDIZEM) 90 MG TABLET    Take 90 mg by mouth 2 (two) times daily.    GABAPENTIN (NEURONTIN) 300 MG CAPSULE    TAKE 1 CAPSULE BY MOUTH ONCE A DAY FOR NERVE PAIN    HYDROCHLOROTHIAZIDE (HYDRODIURIL) 12.5 MG TAB    Take 12.5 mg by mouth.    HYDROXYZINE PAMOATE (VISTARIL) 25 MG CAP    Take 25 mg by mouth every evening.    IRON-VITAMIN C 100-250 MG, ICAR-C, 100-250 MG TAB    Take 1 tablet by mouth once daily.    LEVOCETIRIZINE (XYZAL) 5 MG TABLET    Take 5 mg by mouth once daily.    LEVOTHYROXINE (SYNTHROID) 75 MCG TABLET    Take 1 tablet (75 mcg total) by mouth once daily.    LISINOPRIL 10 MG TABLET    Take 10 mg by mouth once daily.    LORAZEPAM (ATIVAN) 0.5 MG TABLET    Take 1 tablet (0.5 mg total) by mouth every 12 (twelve) hours as needed for Anxiety.    MAGNESIUM 200 MG TAB    magnesium    MONTELUKAST (SINGULAIR) 10 MG TABLET    TK 1 T PO  QD    MULTIVITAMIN CAPSULE    Take 1 capsule by mouth once daily.    NIACIN 250 MG TAB    Take 250 mg by mouth nightly.    PROPRANOLOL (INDERAL LA) 120 MG 24 HR CAPSULE    120 mg once daily.    SIROLIMUS (RAPAMUNE) 1 MG TAB    TAKE 2 TABLETS ONCE DAILY    TRIAMCINOLONE ACETONIDE 0.1% (KENALOG) 0.1 % CREAM    APPLY TO THE AFFECTED AREA(S) ONCE DAILY   Changed and/or Refilled Medications    Modified Medication Previous Medication    PANTOPRAZOLE (PROTONIX) 40 MG TABLET pantoprazole (PROTONIX) 40 MG tablet       Take 1 tablet (40 mg total) by mouth once daily.    TAKE 1 TABLET BY MOUTH EVERY DAY   Discontinued Medications    ATORVASTATIN (LIPITOR) 20 MG TABLET        NASACORT ALLERGY 55 MCG NASAL INHALER    1 spray once daily.    ONDANSETRON (ZOFRAN) IVPB        TRAMADOL (ULTRAM) 50 MG TABLET    Take 50 mg by mouth once daily.    VARICELLA-ZOSTER GE-AS01B, PF, (SHINGRIX, PF,) 50 MCG/0.5 ML INJECTION    Administer two doses 2 months apart         Vital Signs:  /70   Pulse 68    "Ht 5' 5" (1.651 m)   Wt 65.5 kg (144 lb 6.4 oz)   SpO2 97%   BMI 24.03 kg/m²         Physical Exam  Vitals reviewed.   Constitutional:       General: She is awake. She is not in acute distress.     Appearance: Normal appearance. She is well-developed. She is not ill-appearing, toxic-appearing or diaphoretic.   HENT:      Head: Normocephalic and atraumatic.      Nose: Nose normal.      Mouth/Throat:      Mouth: Mucous membranes are moist.      Pharynx: Oropharynx is clear. No oropharyngeal exudate or posterior oropharyngeal erythema.   Eyes:      General: Lids are normal. Gaze aligned appropriately. No scleral icterus.        Right eye: No discharge.         Left eye: No discharge.      Conjunctiva/sclera: Conjunctivae normal.   Neck:      Trachea: Trachea normal.   Cardiovascular:      Rate and Rhythm: Normal rate and regular rhythm.      Pulses:           Radial pulses are 2+ on the right side and 2+ on the left side.   Pulmonary:      Effort: Pulmonary effort is normal. No respiratory distress.      Breath sounds: No stridor. No wheezing.   Chest:      Chest wall: No tenderness.   Abdominal:      General: Bowel sounds are normal. There is no distension.      Palpations: Abdomen is soft. There is no fluid wave, hepatomegaly or mass.      Tenderness: There is no abdominal tenderness. There is no guarding or rebound.   Musculoskeletal:         General: No tenderness or deformity.      Cervical back: Full passive range of motion without pain and neck supple. No tenderness.      Right lower leg: No edema.      Left lower leg: No edema.   Lymphadenopathy:      Cervical: No cervical adenopathy.   Skin:     General: Skin is warm and dry.      Capillary Refill: Capillary refill takes less than 2 seconds.      Coloration: Skin is not cyanotic, jaundiced or pale.   Neurological:      General: No focal deficit present.      Mental Status: She is alert and oriented to person, place, and time.      Motor: No tremor. "   Psychiatric:         Attention and Perception: Attention normal.         Mood and Affect: Mood and affect normal.         Speech: Speech normal.         Behavior: Behavior normal. Behavior is cooperative.            All of the data above and below has been reviewed by myself and any further interpretations will be reflected in the assessment and plan.   The data includes review of external notes, and independent interpretation of lab results, procedures, x-rays, and imaging reports.      Assessment:  Gastroesophageal reflux disease, unspecified whether esophagitis present  -     pantoprazole (PROTONIX) 40 MG tablet; Take 1 tablet (40 mg total) by mouth once daily.  Dispense: 90 tablet; Refill: 4    Status post liver transplant    Anemia, unspecified type    Urinary incontinence, unspecified type  -     Ambulatory referral/consult to Urology; Future; Expected date: 01/30/2024    Cologuard due 2025  Panto 40 daily working well. Trial of QOD.   Dr. العلي following liver.   Her main complaint at this point is urinary incontinence.  She used to see Dr. Romo who has no longer local.  We will consider physical therapy for pelvic health but would like to reestablish with a local urologist.  Will send referral to Dr. Rivera's office.     Recommendations:  May try taking pantoprazole 40 mg every other day.     Risks, benefits, and alternatives of medical management, any associated procedures, and/or treatment discussed with the patient. Patient given opportunity to ask questions and voices understanding. Patient has elected to proceed with the recommended care modalities as discussed.    Instructed patient to notify my office if they have not been contacted within two weeks after any procedures, submitting any samples (biopsies, blood, stool, urine, etc.) or after any imaging (X-ray, CT, MRI, etc.).     Follow up in about 1 year (around 1/23/2025).    Order summary:  Orders Placed This Encounter   Procedures     Ambulatory referral/consult to Urology      This assessment, plan, and documentation was performed in collaboration with Lorelei Fink NP.      This document may have been created using a voice recognition transcribing system. Incorrect words or phrases may have been missed during proofreading. Please interpret accordingly or contact me for clarification.     Amanda Bryan MD

## 2024-01-28 ENCOUNTER — TELEPHONE (OUTPATIENT)
Dept: GASTROENTEROLOGY | Facility: CLINIC | Age: 79
End: 2024-01-28
Payer: MEDICARE

## 2024-01-29 NOTE — TELEPHONE ENCOUNTER
1/10/2024 Cr 1.3, eGFR 42.12L, CMP ownl, 4.46L/10.4L/232, MCV 83, sirolimus 7.2.    [facility] staff, notify patient that I reviewed her blood results from earlier this month. Her Hb remains mildly low. Her liver results look well. Notify me if any issues.  [facility] hepatology/[facility] as MARIEI.  NBP

## 2024-02-02 ENCOUNTER — PATIENT MESSAGE (OUTPATIENT)
Dept: TRANSPLANT | Facility: CLINIC | Age: 79
End: 2024-02-02
Payer: MEDICARE

## 2024-02-05 LAB
EXT ALBUMIN: 4.1
EXT ALKALINE PHOSPHATASE: 82
EXT ALT: 19
EXT AST: 30
EXT BASOPHIL%: 0.7
EXT BILIRUBIN TOTAL: 0.8
EXT BUN: 29
EXT CALCIUM: 9.6
EXT CHLORIDE: 103
EXT CHOLESTEROL: 199
EXT CO2: 35
EXT CREATININE: 1.3 MG/DL
EXT EOSINOPHIL%: 2.5
EXT GFR MDRD NON AF AMER: 42
EXT GLUCOSE: 111
EXT HDL: 34
EXT HEMATOCRIT: 32.7
EXT HEMOGLOBIN: 10.4
EXT LDL CHOLESTEROL: 125
EXT LYMPH%: 35
EXT MONOCYTES%: 11.9
EXT PLATELETS: 232
EXT POTASSIUM: 3.6
EXT PROTEIN TOTAL: 7.5
EXT SEGS%: 49.7
EXT SIROLIMUS LVL: 7.2
EXT SODIUM: 140 MMOL/L
EXT TRIGLYCERIDES: 100
EXT WBC: 4.46

## 2024-02-06 ENCOUNTER — TELEPHONE (OUTPATIENT)
Dept: TRANSPLANT | Facility: CLINIC | Age: 79
End: 2024-02-06
Payer: MEDICARE

## 2024-02-06 NOTE — TELEPHONE ENCOUNTER
----- Message from Angela العلي MD sent at 2/5/2024  3:59 PM CST -----  Reviewed, nothing to do; repeat per routine

## 2024-02-27 ENCOUNTER — TELEPHONE (OUTPATIENT)
Dept: UROLOGY | Facility: CLINIC | Age: 79
End: 2024-02-27

## 2024-02-27 ENCOUNTER — OFFICE VISIT (OUTPATIENT)
Dept: UROLOGY | Facility: CLINIC | Age: 79
End: 2024-02-27
Payer: MEDICARE

## 2024-02-27 VITALS
WEIGHT: 144 LBS | HEART RATE: 60 BPM | BODY MASS INDEX: 23.99 KG/M2 | SYSTOLIC BLOOD PRESSURE: 134 MMHG | HEIGHT: 65 IN | DIASTOLIC BLOOD PRESSURE: 60 MMHG

## 2024-02-27 DIAGNOSIS — R32 URINARY INCONTINENCE, UNSPECIFIED TYPE: Primary | ICD-10-CM

## 2024-02-27 PROCEDURE — 99204 OFFICE O/P NEW MOD 45 MIN: CPT | Mod: S$GLB,,, | Performed by: UROLOGY

## 2024-02-27 RX ORDER — SOLIFENACIN SUCCINATE 5 MG/1
5 TABLET, FILM COATED ORAL DAILY
Qty: 90 TABLET | Refills: 3 | Status: SHIPPED | OUTPATIENT
Start: 2024-02-27 | End: 2025-02-26

## 2024-02-27 RX ORDER — POTASSIUM CHLORIDE 750 MG/1
10 TABLET, EXTENDED RELEASE ORAL
COMMUNITY
Start: 2024-02-05

## 2024-02-27 RX ORDER — ATORVASTATIN CALCIUM 20 MG/1
20 TABLET, FILM COATED ORAL
COMMUNITY
Start: 2024-01-29

## 2024-02-27 RX ORDER — FERROUS SULFATE 325(65) MG
TABLET ORAL
COMMUNITY
Start: 2024-02-01

## 2024-02-27 NOTE — TELEPHONE ENCOUNTER
Discussed with NP, advised pt she can come in to pickup samples. Pt verbalized understanding. BJP    ----- Message from Gloria Storm sent at 2/27/2024 12:40 PM CST -----  Regarding: Medication  Contact: Pt  Pt states that she's already taking Medication that was prescribed and doesn't seem to be working    Please call to advise    Phone  [phone]    Thank You

## 2024-02-27 NOTE — PROGRESS NOTES
Subjective:       Patient ID: Sheri Bates is a 79 y.o. female.    Chief Complaint: Urinary Incontinence      HPI: 79-year-old female patient presenting to the clinic to establish care. Patient complains of urgency and frequency as well as nocturia x2.  Patient denies any urine leakage.  She denies any symptoms of infection.  She has never been on any medications for this issue.          Past Medical History:   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    cryptogenic    Migraine headache     Need for prophylactic immunotherapy     Osteopenia     Skin cancer        Past Surgical Historical:   Past Surgical History:   Procedure Laterality Date    COLONOSCOPY      HYSTERECTOMY  1972    LIVER TRANSPLANT  12/05/1993    OOPHORECTOMY  1968    skin cancer removal Left     Left arm        Medications:   Medication List with Changes/Refills   New Medications    SOLIFENACIN (VESICARE) 5 MG TABLET    Take 1 tablet (5 mg total) by mouth once daily.   Current Medications    ATORVASTATIN (LIPITOR) 20 MG TABLET    Take 20 mg by mouth.    CHOLECALCIFEROL, VITAMIN D3, 125 MCG (5,000 UNIT) TAB    Vitamin D    DILTIAZEM (CARDIZEM) 90 MG TABLET    Take 90 mg by mouth 2 (two) times daily.    FERROUS SULFATE (FEOSOL) 325 MG (65 MG IRON) TAB TABLET    Take by mouth.    GABAPENTIN (NEURONTIN) 300 MG CAPSULE    TAKE 1 CAPSULE BY MOUTH ONCE A DAY FOR NERVE PAIN    HYDROCHLOROTHIAZIDE (HYDRODIURIL) 12.5 MG TAB    Take 12.5 mg by mouth.    HYDROXYZINE PAMOATE (VISTARIL) 25 MG CAP    Take 25 mg by mouth every evening.    IRON-VITAMIN C 100-250 MG, ICAR-C, 100-250 MG TAB    Take 1 tablet by mouth once daily.    LEVOCETIRIZINE (XYZAL) 5 MG TABLET    Take 5 mg by mouth once daily.    LEVOTHYROXINE (SYNTHROID) 75 MCG TABLET    Take 1  tablet (75 mcg total) by mouth once daily.    LORAZEPAM (ATIVAN) 0.5 MG TABLET    Take 1 tablet (0.5 mg total) by mouth every 12 (twelve) hours as needed for Anxiety.    MAGNESIUM 200 MG TAB    magnesium    MONTELUKAST (SINGULAIR) 10 MG TABLET    TK 1 T PO  QD    MULTIVITAMIN CAPSULE    Take 1 capsule by mouth once daily.    NIACIN 250 MG TAB    Take 250 mg by mouth nightly.    PANTOPRAZOLE (PROTONIX) 40 MG TABLET    Take 1 tablet (40 mg total) by mouth once daily.    POTASSIUM CHLORIDE SA (K-DUR,KLOR-CON M) 10 MEQ TABLET    Take 10 mEq by mouth.    PROPRANOLOL (INDERAL LA) 120 MG 24 HR CAPSULE    120 mg once daily.    SIROLIMUS (RAPAMUNE) 1 MG TAB    TAKE 2 TABLETS ONCE DAILY    TRIAMCINOLONE ACETONIDE 0.1% (KENALOG) 0.1 % CREAM    APPLY TO THE AFFECTED AREA(S) ONCE DAILY   Discontinued Medications    ATORVASTATIN (LIPITOR) 10 MG TABLET    Take 10 mg by mouth.    LISINOPRIL 10 MG TABLET    Take 10 mg by mouth once daily.        Past Social History:   Social History     Socioeconomic History    Marital status:    Tobacco Use    Smoking status: Never    Smokeless tobacco: Never   Substance and Sexual Activity    Alcohol use: No    Drug use: Never       Allergies:   Review of patient's allergies indicates:   Allergen Reactions    Azithromycin Diarrhea and Nausea Only        Family History:   Family History   Problem Relation Age of Onset    Coronary artery disease Mother 73    Coronary artery disease Father 62    Stomach cancer Brother 82    Stomach cancer Son     Stomach cancer Daughter         Review of Systems:  Review of Systems   Constitutional:  Negative for activity change, appetite change, chills, diaphoresis, fatigue, fever and unexpected weight change.   HENT:  Negative for congestion, dental problem, drooling, ear discharge, ear pain, facial swelling, hearing loss, mouth sores, nosebleeds, postnasal drip, rhinorrhea, sinus pressure, sinus pain, sneezing, sore throat, tinnitus, trouble swallowing and  voice change.    Eyes:  Negative for photophobia, pain, discharge, redness, itching and visual disturbance.   Respiratory:  Negative for apnea, cough, choking, chest tightness, shortness of breath, wheezing and stridor.    Cardiovascular:  Negative for chest pain and leg swelling.   Gastrointestinal:  Negative for abdominal distention, abdominal pain, anal bleeding, blood in stool, constipation, diarrhea, nausea, rectal pain and vomiting.   Endocrine: Negative for cold intolerance, heat intolerance, polydipsia, polyphagia and polyuria.   Genitourinary:  Positive for frequency and urgency. Negative for decreased urine volume, difficulty urinating, dyspareunia, dysuria, enuresis, flank pain, genital sores, hematuria, menstrual problem, pelvic pain, vaginal bleeding, vaginal discharge and vaginal pain.   Musculoskeletal:  Negative for arthralgias, back pain, gait problem, joint swelling, myalgias, neck pain and neck stiffness.   Skin:  Negative for color change, pallor, rash and wound.   Allergic/Immunologic: Negative for environmental allergies, food allergies and immunocompromised state.   Neurological:  Negative for dizziness, tremors, seizures, syncope, facial asymmetry, speech difficulty, weakness, light-headedness, numbness and headaches.   Hematological:  Negative for adenopathy. Does not bruise/bleed easily.   Psychiatric/Behavioral:  Negative for agitation, behavioral problems, confusion, decreased concentration, dysphoric mood, hallucinations, self-injury, sleep disturbance and suicidal ideas. The patient is not nervous/anxious and is not hyperactive.        Physical Exam:  Physical Exam  Exam conducted with a chaperone present.   Constitutional:       Appearance: Normal appearance.   HENT:      Head: Normocephalic.      Nose: Nose normal.      Mouth/Throat:      Mouth: Mucous membranes are moist.      Pharynx: Oropharynx is clear.   Eyes:      Extraocular Movements: Extraocular movements intact.       Conjunctiva/sclera: Conjunctivae normal.      Pupils: Pupils are equal, round, and reactive to light.   Cardiovascular:      Rate and Rhythm: Normal rate and regular rhythm.      Pulses: Normal pulses.      Heart sounds: Normal heart sounds.   Pulmonary:      Effort: Pulmonary effort is normal.      Breath sounds: Normal breath sounds.   Abdominal:      General: Abdomen is flat. Bowel sounds are normal.      Palpations: Abdomen is soft.      Hernia: There is no hernia in the left inguinal area or right inguinal area.   Genitourinary:     General: Normal vulva.      Pubic Area: No rash or pubic lice.       Labia:         Right: No rash, tenderness, lesion or injury.         Left: No rash, tenderness, lesion or injury.       Urethra: No prolapse, urethral pain, urethral swelling or urethral lesion.      Rectum: Normal.   Musculoskeletal:         General: Normal range of motion.      Cervical back: Normal range of motion and neck supple.   Lymphadenopathy:      Lower Body: No right inguinal adenopathy. No left inguinal adenopathy.   Skin:     General: Skin is warm and dry.      Capillary Refill: Capillary refill takes less than 2 seconds.   Neurological:      General: No focal deficit present.      Mental Status: She is alert and oriented to person, place, and time. Mental status is at baseline.   Psychiatric:         Mood and Affect: Mood normal.         Behavior: Behavior normal.         Thought Content: Thought content normal.         Judgment: Judgment normal.         Assessment/Plan:     Urinary urgency and frequency:  We will start the patient on VESIcare 5 mg today.  Instructed patient in 1 year.  She denies any symptoms of infection.  PVR is 1 mL.  Discussed decreasing fluids in the evening and practicing timed voiding as well.  Notify our office if she needs to be seen seen sooner    I, Dr. Malvin Rivera have seen and personally evaluated the patient. I have formulated the plan reviewed all pertinent imaging  and clinical data.  I agree with the nurse practitioner's assessment, and I have personally formulated the plan for this patient's care as described by the midlevel.  Problem List Items Addressed This Visit    None  Visit Diagnoses       Urinary incontinence, unspecified type    -  Primary

## 2024-04-19 ENCOUNTER — TELEPHONE (OUTPATIENT)
Dept: TRANSPLANT | Facility: CLINIC | Age: 79
End: 2024-04-19
Payer: MEDICARE

## 2024-04-26 LAB
EXT ALBUMIN: 4
EXT ALKALINE PHOSPHATASE: 73
EXT ALT: 14
EXT AST: 24
EXT BILIRUBIN TOTAL: 0.4
EXT BUN: 33
EXT CALCIUM: 10
EXT CHLORIDE: 104
EXT CHOLESTEROL: 223
EXT CO2: 26
EXT CREATININE: 1.2 MG/DL
EXT EOSINOPHIL%: 2.4
EXT GFR MDRD NON AF AMER: 46
EXT GLUCOSE: 117
EXT HDL: 37
EXT HEMATOCRIT: 31.9
EXT HEMOGLOBIN: 10.5
EXT LDL CHOLESTEROL: 139
EXT LYMPH%: 40.3
EXT MONOCYTES%: 9.4
EXT PLATELETS: 216
EXT POTASSIUM: 3.8
EXT PROTEIN TOTAL: 7.5
EXT SEGS%: 46.8
EXT SIROLIMUS LVL: 6.5
EXT SODIUM: 138 MMOL/L
EXT TRIGLYCERIDES: 237
EXT WBC: 4.7

## 2024-05-03 ENCOUNTER — PATIENT MESSAGE (OUTPATIENT)
Dept: TRANSPLANT | Facility: CLINIC | Age: 79
End: 2024-05-03
Payer: MEDICARE

## 2024-05-03 DIAGNOSIS — Z94.4 STATUS POST LIVER TRANSPLANT: ICD-10-CM

## 2024-05-03 RX ORDER — SIROLIMUS 1 MG/1
1 TABLET, FILM COATED ORAL DAILY
Qty: 90 TABLET | Refills: 3 | Status: SHIPPED | OUTPATIENT
Start: 2024-05-03

## 2024-05-03 NOTE — LETTER
"   LAB ORDERS    5/3/2024 labs due week of 24      ORDERING MD:   Dr. Angela العلي MD, MPH - NPI #[id number]        Patient Name: Sheri Bates               : 1945    [facility] Number: [id number]                  #:        Please draw the following labs:     Test Frequency  Diagnosis/ICD-10 Code     CBC/DIFF/PLT once  Z94.4 Liver Transplant       Complete Metabolic Panel once  Z94.4 Liver Transplant         Rapamune level once  Z94.4 Liver Transplant              FAX RESULTS [phone] "[facility] Liver Transplant Coordinator", and send the hard copy when completed. If you have any questions regarding this request or need additional information, please call [phone].      [facility]  Fax [phone]  "

## 2024-05-03 NOTE — TELEPHONE ENCOUNTER
Notified patient via portal with read back of med change and to repeat labs next week----- Message from Angela العلي MD sent at 5/2/2024  8:06 PM CDT -----  Decrease sirolimus to 1mg daily and repeat labs in a week

## 2024-05-13 ENCOUNTER — TELEPHONE (OUTPATIENT)
Dept: TRANSPLANT | Facility: CLINIC | Age: 79
End: 2024-05-13
Payer: MEDICARE

## 2024-05-13 ENCOUNTER — PATIENT MESSAGE (OUTPATIENT)
Dept: TRANSPLANT | Facility: CLINIC | Age: 79
End: 2024-05-13
Payer: MEDICARE

## 2024-05-13 LAB
EXT ALBUMIN: 4.1
EXT ALKALINE PHOSPHATASE: 68
EXT ALT: 13
EXT AST: 25
EXT BILIRUBIN TOTAL: 0.5
EXT BUN: 34
EXT CALCIUM: 9.5
EXT CHLORIDE: 104
EXT CHOLESTEROL: 191
EXT CO2: 29
EXT CREATININE: 1.3 MG/DL
EXT EOSINOPHIL%: 2
EXT GFR MDRD NON AF AMER: 42
EXT GLUCOSE: 97
EXT HDL: 35
EXT HEMATOCRIT: 31.3
EXT HEMOGLOBIN: 10
EXT LDL CHOLESTEROL: 126
EXT LYMPH%: 39.9
EXT MONOCYTES%: 10.6
EXT PLATELETS: 214
EXT POTASSIUM: 3.4
EXT PROTEIN TOTAL: 7.4
EXT SEGS%: 46.6
EXT SIROLIMUS LVL: 3.8
EXT SODIUM: 140 MMOL/L
EXT TRIGLYCERIDES: 149
EXT WBC: 4.6

## 2024-05-13 NOTE — TELEPHONE ENCOUNTER
----- Message from Angela العلي MD sent at 5/13/2024  4:19 PM CDT -----  Cholesterol profile has improved.  Liver tests remain normal.  Sirolimus level is running lower which is likely most helpful for the patient.  Repeat transplant labs per routine.

## 2024-05-13 NOTE — TELEPHONE ENCOUNTER
Patient notified and instructed via [facility]:    Per : Cholesterol profile has improved.  Liver tests remain normal.  Sirolimus level is running lower which is likely most helpful for the patient.  Repeat transplant labs per routine, (due 8/5/24).

## 2024-07-24 ENCOUNTER — PATIENT MESSAGE (OUTPATIENT)
Dept: HEPATOLOGY | Facility: CLINIC | Age: 79
End: 2024-07-24
Payer: MEDICARE

## 2024-08-08 ENCOUNTER — TELEPHONE (OUTPATIENT)
Dept: TRANSPLANT | Facility: CLINIC | Age: 79
End: 2024-08-08
Payer: MEDICARE

## 2024-08-21 LAB
EXT ALBUMIN: 4.1
EXT ALKALINE PHOSPHATASE: 64
EXT ALT: 12
EXT AST: 25
EXT BILIRUBIN TOTAL: 0.8
EXT BUN: 35
EXT CALCIUM: 9.9
EXT CHLORIDE: 103
EXT CO2: 29
EXT CREATININE: 1.5 MG/DL
EXT EOSINOPHIL%: 1.6
EXT GFR MDRD NON AF AMER: 35
EXT GLUCOSE: 101
EXT HEMATOCRIT: 31.8
EXT HEMOGLOBIN: 10.4
EXT LYMPH%: 36.3
EXT MONOCYTES%: 13.4
EXT PLATELETS: 238
EXT POTASSIUM: 3.9
EXT PROTEIN TOTAL: 7.6
EXT SEGS%: 47.6
EXT SIROLIMUS LVL: 3.9
EXT SODIUM: 140 MMOL/L
EXT WBC: 4.3

## 2024-08-23 ENCOUNTER — TELEPHONE (OUTPATIENT)
Dept: TRANSPLANT | Facility: CLINIC | Age: 79
End: 2024-08-23
Payer: MEDICARE

## 2024-08-23 NOTE — TELEPHONE ENCOUNTER
----- Message from Angela العلي MD sent at 8/22/2024  8:50 PM CDT -----  Reviewed, nothing to do; repeat per routine

## 2024-09-07 DIAGNOSIS — K21.9 GASTROESOPHAGEAL REFLUX DISEASE, UNSPECIFIED WHETHER ESOPHAGITIS PRESENT: ICD-10-CM

## 2024-09-09 NOTE — TELEPHONE ENCOUNTER
Looks like patient only wants to see NBP for office visit. Next OV scheduled 1/23/2025 at 12:30 pm w/ NBP. DMP

## 2024-09-10 RX ORDER — PANTOPRAZOLE SODIUM 40 MG/1
40 TABLET, DELAYED RELEASE ORAL
Qty: 90 TABLET | Refills: 4 | Status: SHIPPED | OUTPATIENT
Start: 2024-09-10

## 2024-10-29 ENCOUNTER — TELEPHONE (OUTPATIENT)
Dept: TRANSPLANT | Facility: CLINIC | Age: 79
End: 2024-10-29
Payer: MEDICARE

## 2024-11-10 NOTE — TELEPHONE ENCOUNTER
----- Message from Melanie Gifford MD sent at 5/27/2023  3:16 PM CDT -----  The Labs are stable - please let patient know.   Please follow up with your Primary Care Provider (PCP) within the next 2-3 days regarding your ED visit.  Please call your doctor or return to the nearest emergency department with any new or worsening symptoms that are concerning to you.  These documents have a lot of useful information! Please read them, so you know what to expect, what you can do for yourself at home, and also to know concerning signs warrant a return to the Emergency Department for additional evaluation.  You are welcome back any time. Thank you for entrusting your care to us, I hope we made your visit as pleasant as possible. Wishing you well!    Dr. Luna

## 2024-11-25 ENCOUNTER — TELEPHONE (OUTPATIENT)
Dept: TRANSPLANT | Facility: CLINIC | Age: 79
End: 2024-11-25
Payer: MEDICARE

## 2024-11-25 LAB
EXT ALBUMIN: 4.2
EXT ALKALINE PHOSPHATASE: 72
EXT ALT: 13
EXT AST: 26
EXT BILIRUBIN TOTAL: 0.7
EXT BUN: 31
EXT CALCIUM: 9.6
EXT CHLORIDE: 101
EXT CHOLESTEROL: 217
EXT CO2: 33
EXT CREATININE: 1.5 MG/DL
EXT EOSINOPHIL%: 3.1
EXT GFR MDRD NON AF AMER: 35
EXT GLUCOSE: 109
EXT HDL: 38
EXT HEMATOCRIT: 34.3
EXT HEMOGLOBIN: 11.1
EXT LDL CHOLESTEROL: 131
EXT LYMPH%: 35.9
EXT MONOCYTES%: 12.9
EXT PLATELETS: 259
EXT POTASSIUM: 3.5
EXT PROTEIN TOTAL: 8
EXT SEGS%: 46.7
EXT SIROLIMUS LVL: ABNORMAL
EXT SODIUM: 141 MMOL/L
EXT TRIGLYCERIDES: 238
EXT WBC: 4.8

## 2024-11-25 NOTE — TELEPHONE ENCOUNTER
"Request for Rapamune level faxed 11/18/24 with no response from lab. Called and spoke to "Ludmila" at [facility], she reported that Rapamune level was not drawn "this time", "sorry, it must have been missed", she said.  She acknowledged having stanging lab orders good from 11/4/24- 5/4/2025.    "

## 2024-11-26 ENCOUNTER — TELEPHONE (OUTPATIENT)
Dept: TRANSPLANT | Facility: CLINIC | Age: 79
End: 2024-11-26
Payer: MEDICARE

## 2024-11-26 NOTE — TELEPHONE ENCOUNTER
----- Message from Shantal Daugherty MD sent at 11/26/2024  2:58 AM CST -----  Please inform patient results are OK. Continue routine labs.

## 2025-01-23 ENCOUNTER — TELEPHONE (OUTPATIENT)
Dept: GASTROENTEROLOGY | Facility: CLINIC | Age: 80
End: 2025-01-23

## 2025-01-23 NOTE — TELEPHONE ENCOUNTER
----- Message from Radha sent at 1/22/2025  4:29 PM CST -----  Contact: self  Type: Appointment Access    Who called: Sheri Bates  Reason for visit: 1 year  When does the patient need to be seen?: next available  Next Available Appointment: blocked  Would the patient rather a call back or a response via [facility]?: call back  Best Call Back Number: [phone]  Additional Information: n/a

## 2025-01-23 NOTE — TELEPHONE ENCOUNTER
Staff return call.. pt [place] due to [place] and ice.. spoke to NBP.,,. add pt to 1-28-25 at 12:15pm...   DANIELA

## 2025-01-28 ENCOUNTER — TELEPHONE (OUTPATIENT)
Dept: GASTROENTEROLOGY | Facility: CLINIC | Age: 80
End: 2025-01-28

## 2025-01-28 ENCOUNTER — OFFICE VISIT (OUTPATIENT)
Dept: GASTROENTEROLOGY | Facility: CLINIC | Age: 80
End: 2025-01-28
Payer: MEDICARE

## 2025-01-28 VITALS
HEIGHT: 65 IN | RESPIRATION RATE: 16 BRPM | SYSTOLIC BLOOD PRESSURE: 129 MMHG | OXYGEN SATURATION: 99 % | BODY MASS INDEX: 23.82 KG/M2 | DIASTOLIC BLOOD PRESSURE: 74 MMHG | HEART RATE: 64 BPM | WEIGHT: 143 LBS

## 2025-01-28 DIAGNOSIS — Z12.11 SCREENING FOR COLON CANCER: ICD-10-CM

## 2025-01-28 DIAGNOSIS — Z94.4 STATUS POST LIVER TRANSPLANT: ICD-10-CM

## 2025-01-28 DIAGNOSIS — D64.9 ANEMIA, UNSPECIFIED TYPE: ICD-10-CM

## 2025-01-28 DIAGNOSIS — K21.9 GASTROESOPHAGEAL REFLUX DISEASE, UNSPECIFIED WHETHER ESOPHAGITIS PRESENT: Primary | ICD-10-CM

## 2025-01-28 PROCEDURE — 99214 OFFICE O/P EST MOD 30 MIN: CPT | Mod: S$PBB,,, | Performed by: INTERNAL MEDICINE

## 2025-01-28 RX ORDER — EZETIMIBE 10 MG/1
10 TABLET ORAL NIGHTLY
COMMUNITY
Start: 2024-11-19

## 2025-01-28 RX ORDER — PANTOPRAZOLE SODIUM 20 MG/1
20 TABLET, DELAYED RELEASE ORAL DAILY
Qty: 90 TABLET | Refills: 3 | Status: SHIPPED | OUTPATIENT
Start: 2025-01-28 | End: 2026-01-28

## 2025-01-28 RX ORDER — AMLODIPINE BESYLATE 5 MG/1
5 TABLET ORAL DAILY
COMMUNITY
Start: 2025-01-19

## 2025-01-28 RX ORDER — APIXABAN 5 MG/1
5 TABLET, FILM COATED ORAL 2 TIMES DAILY
COMMUNITY
Start: 2024-10-09

## 2025-01-28 RX ORDER — HYDROXYZINE HYDROCHLORIDE 25 MG/1
25 TABLET, FILM COATED ORAL EVERY 4 HOURS PRN
COMMUNITY
Start: 2025-01-06

## 2025-01-28 NOTE — PROGRESS NOTES
Clinic Note    Reason for visit:  The primary encounter diagnosis was Gastroesophageal reflux disease, unspecified whether esophagitis present. Diagnoses of Status post liver transplant, Anemia, unspecified type, and Screening for colon cancer were also pertinent to this visit.    PCP: Stanford Sparrow       HPI:  This is a 79 y.o. female here for follow up. Patient with h/o anemia, h/o HCV s/p liver transplant. Previously followed by Dr. العلي. Dr. Daugherty is now following her labs. She takes pantoprazole 40 every other day and does well with this. Denies dysphagia, abdominal pain, rectal bleeding.    11/4/2024: Hgb 11.1, Cr 1.5H, trigly 238H, sirolimus not done.  1/10/2024 Cr 1.3, eGFR 42.12L, CMP ownl, 4.46L/10.4L/232, MCV 83, sirolimus 7.2.   11/2023: Hgb 10.8L, CBC onl  9/2023: Cr 1.1, CMP nl, Sirolimus 9.4, Hgb 11L  3/21/2023 CMP/lip/INR nl, Hb 11.9L, MCV 88, CBC onl.  Sirolimus 5.2 (blood drawn a few hours after taking medicine).    EGD 3/28/2023: smallHH, DBx nl, GBx react w/focal mini chr inact gitis w/o Hp, GEBx reflux     9/2022 Cologuard negative.      Old chart:  Anemia: mild (9/2020 Hb 11.9), MCV normal, lifelong, no overt blood loss, work up otherwise normal. Hb electrophoresis nl.  Heartburn: infrequent Sx, PRN ome 40 and infrequent use.   Gastroesophageal Reflux Disease (GERD)  Liver transplant recipient: followed by [facility] once a year, on Rapamune, blood work every 3 months, patient following with Dr. Angela Lopes in [place]  Screening for malignant neoplasms of colon: high risk given chronic immunosuppression, last colonoscopy 2011, declines colonoscopy, offered Cologuard and agrees and if positive will do colonoscopy, negative 9/2019, due 2022  Viral hepatitis C: 3/2017 VL negative  Bloating symptom: likely maldigestion related, given low FODMAP handout to help identify trigger foods    Review of Systems   Constitutional:  Negative for fatigue, fever and unexpected weight change.   HENT:  Negative for  mouth sores, postnasal drip, sore throat and trouble swallowing.    Eyes:  Negative for pain, discharge and eye dryness.   Respiratory:  Negative for apnea, cough, choking, chest tightness, shortness of breath and wheezing.    Cardiovascular:  Positive for leg swelling. Negative for chest pain and palpitations.   Gastrointestinal:  Negative for abdominal distention, abdominal pain, anal bleeding, blood in stool, change in bowel habit, constipation, diarrhea, nausea, rectal pain, vomiting, reflux and fecal incontinence.   Genitourinary:  Positive for bladder incontinence. Negative for dysuria and hematuria.   Musculoskeletal:  Negative for arthralgias, back pain and joint swelling.   Integumentary:  Negative for color change and rash.   Allergic/Immunologic: Negative for environmental allergies and food allergies.   Neurological:  Negative for seizures and headaches.   Hematological:  Negative for adenopathy. Does not bruise/bleed easily.        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    cryptogenic    Migraine headache     Need for prophylactic immunotherapy     Osteopenia     Skin cancer      Past Surgical History:   Procedure Laterality Date    COLONOSCOPY      HYSTERECTOMY  1972    LIVER TRANSPLANT  12/05/1993    OOPHORECTOMY  1968    skin cancer removal Left     Left arm     Family History   Problem Relation Name Age of Onset    Coronary artery disease Mother  73    Coronary artery disease Father  62    Stomach cancer Brother  82    Stomach cancer Daughter      Stomach cancer Son      Colon cancer Neg Hx      Throat cancer Neg Hx      Liver disease Neg Hx      Ulcerative colitis Neg Hx      Crohn's disease Neg Hx      Pancreatic cancer Neg Hx      Esophageal cancer Neg Hx       Social History     Tobacco  Use    Smoking status: Never    Smokeless tobacco: Never   Substance Use Topics    Alcohol use: No    Drug use: Never     Review of patient's allergies indicates:   Allergen Reactions    Azithromycin Diarrhea and Nausea Only        Medication List with Changes/Refills   Current Medications    AMLODIPINE (NORVASC) 5 MG TABLET    Take 5 mg by mouth once daily.    CHOLECALCIFEROL, VITAMIN D3, 125 MCG (5,000 UNIT) TAB    Vitamin D    DILTIAZEM (CARDIZEM) 90 MG TABLET    Take 90 mg by mouth 2 (two) times daily.    ELIQUIS 5 MG TAB    Take 5 mg by mouth 2 (two) times daily.    EZETIMIBE (ZETIA) 10 MG TABLET    Take 10 mg by mouth every evening.    FERROUS SULFATE (FEOSOL) 325 MG (65 MG IRON) TAB TABLET    Take by mouth.    GABAPENTIN (NEURONTIN) 300 MG CAPSULE    TAKE 1 CAPSULE BY MOUTH ONCE A DAY FOR NERVE PAIN    HYDROXYZINE HCL (ATARAX) 25 MG TABLET    Take 25 mg by mouth every 4 (four) hours as needed for Itching.    IRON-VITAMIN C 100-250 MG, ICAR-C, 100-250 MG TAB    Take 1 tablet by mouth once daily.    LEVOCETIRIZINE (XYZAL) 5 MG TABLET    Take 5 mg by mouth once daily.    LEVOTHYROXINE (SYNTHROID) 75 MCG TABLET    Take 1 tablet (75 mcg total) by mouth once daily.    LORAZEPAM (ATIVAN) 0.5 MG TABLET    Take 1 tablet (0.5 mg total) by mouth every 12 (twelve) hours as needed for Anxiety.    MAGNESIUM 200 MG TAB    magnesium    MONTELUKAST (SINGULAIR) 10 MG TABLET    TK 1 T PO  QD    MULTIVITAMIN CAPSULE    Take 1 capsule by mouth once daily.    NIACIN 250 MG TAB    Take 250 mg by mouth nightly.    POTASSIUM CHLORIDE SA (K-DUR,KLOR-CON M) 10 MEQ TABLET    Take 10 mEq by mouth.    PROPRANOLOL (INDERAL LA) 120 MG 24 HR CAPSULE    120 mg once daily.    SIROLIMUS (RAPAMUNE) 1 MG TAB    Take 1 tablet (1 mg total) by mouth once daily.    SOLIFENACIN (VESICARE) 5 MG TABLET    Take 1 tablet (5 mg total) by mouth once daily.    TRIAMCINOLONE ACETONIDE 0.1% (KENALOG) 0.1 % CREAM    APPLY TO THE AFFECTED AREA(S) ONCE DAILY  "  Changed and/or Refilled Medications    Modified Medication Previous Medication    PANTOPRAZOLE (PROTONIX) 20 MG TABLET pantoprazole (PROTONIX) 40 MG tablet       Take 1 tablet (20 mg total) by mouth once daily.    TAKE 1 TABLET BY MOUTH EVERY DAY   Discontinued Medications    ATORVASTATIN (LIPITOR) 20 MG TABLET    Take 20 mg by mouth.    HYDROCHLOROTHIAZIDE (HYDRODIURIL) 12.5 MG TAB    Take 12.5 mg by mouth.    HYDROXYZINE PAMOATE (VISTARIL) 25 MG CAP    Take 25 mg by mouth every evening.         Vital Signs:  /74   Pulse 64   Resp 16   Ht 5' 5" (1.651 m)   Wt 64.9 kg (143 lb)   SpO2 99%   BMI 23.80 kg/m²         Physical Exam  Vitals reviewed.   Constitutional:       General: She is awake. She is not in acute distress.     Appearance: Normal appearance. She is well-developed. She is not ill-appearing, toxic-appearing or diaphoretic.   HENT:      Head: Normocephalic and atraumatic.      Nose: Nose normal.      Mouth/Throat:      Mouth: Mucous membranes are moist.      Pharynx: Oropharynx is clear. No oropharyngeal exudate or posterior oropharyngeal erythema.   Eyes:      General: Lids are normal. Gaze aligned appropriately. No scleral icterus.        Right eye: No discharge.         Left eye: No discharge.      Conjunctiva/sclera: Conjunctivae normal.   Neck:      Trachea: Trachea normal.   Cardiovascular:      Rate and Rhythm: Normal rate and regular rhythm.      Pulses:           Radial pulses are 2+ on the right side and 2+ on the left side.   Pulmonary:      Effort: Pulmonary effort is normal. No respiratory distress.      Breath sounds: No stridor. No wheezing.   Chest:      Chest wall: No tenderness.   Abdominal:      General: Bowel sounds are normal. There is no distension.      Palpations: Abdomen is soft. There is no fluid wave, hepatomegaly or mass.      Tenderness: There is no abdominal tenderness. There is no guarding or rebound.   Musculoskeletal:         General: No tenderness or " deformity.      Cervical back: No tenderness.      Right lower leg: No edema.      Left lower leg: No edema.   Lymphadenopathy:      Cervical: No cervical adenopathy.   Skin:     General: Skin is warm and dry.      Capillary Refill: Capillary refill takes less than 2 seconds.      Coloration: Skin is not cyanotic, jaundiced or pale.   Neurological:      General: No focal deficit present.      Mental Status: She is alert and oriented to person, place, and time.      Motor: No tremor.   Psychiatric:         Attention and Perception: Attention normal.         Mood and Affect: Mood and affect normal.         Speech: Speech normal.         Behavior: Behavior normal. Behavior is cooperative.            All of the data above and below has been reviewed by myself and any further interpretations will be reflected in the assessment and plan.   The data includes review of external notes, and independent interpretation of lab results, procedures, x-rays, and imaging reports.      Assessment:  Gastroesophageal reflux disease, unspecified whether esophagitis present  -     pantoprazole (PROTONIX) 20 MG tablet; Take 1 tablet (20 mg total) by mouth once daily.  Dispense: 90 tablet; Refill: 3    Status post liver transplant    Anemia, unspecified type    Screening for colon cancer  -     Cologuard Screening (Multitarget Stool DNA); Future; Expected date: 01/28/2025    GERD -doing well with pantoprazole 40mg QOD. Will attempt to decrease to 20mg daily.  Liver followed by Dr. Daugherty.   Cologuard due 9/2025.     Recommendations:    Complete Cologuard test in 9/2025.  Decrease pantoprazole to 20mg daily.    If any tests, procedures, or imaging has been ordered and you are not contacted to schedule within 1-2 weeks, then you may call the central scheduling department directly at [phone].     Risks, benefits, and alternatives of medical management, any associated procedures, and/or treatment discussed with the patient. Patient given  opportunity to ask questions and voices understanding. Patient has elected to proceed with the recommended care modalities as discussed.    Instructed patient to notify my office if they have not been contacted within two weeks after any procedures, submitting any samples (biopsies, blood, stool, urine, etc.) or after any imaging (X-ray, CT, MRI, etc.).     Follow up in about 1 year (around 1/28/2026).    Order summary:  Orders Placed This Encounter   Procedures    Cologuard Screening (Multitarget Stool DNA)      This assessment, plan, and documentation was performed in collaboration with Crystal Baker NP.     This document may have been created using a voice recognition transcribing system. Incorrect words or phrases may have been missed during proofreading. Please interpret accordingly or contact me for clarification.     Amanda Bryan MD

## 2025-01-28 NOTE — PATIENT INSTRUCTIONS
Complete Cologuard test in 9/2025.  Decrease pantoprazole to 20mg daily.    If any tests, procedures, or imaging has been ordered and you are not contacted to schedule within 1-2 weeks, then you may call the central scheduling department directly at [phone].   Please notify my office if you have not been contacted within two weeks after any procedures, submitting any samples (biopsies, blood, stool, urine, etc.) or after any imaging (X-ray, CT, MRI, etc.).

## 2025-01-28 NOTE — LETTER
January 28, 2025        Stanford Sparrow MD  [address]  [place] [address]             [place] [facility]  [address] [place] [address]  Phone: [phone]  Fax: [phone]   Patient: Sheri Bates   MR Number: [id number]   YOB: 1945   Date of Visit: 1/28/2025       Dear Dr. Sparrow:    Thank you for referring Sheri Bates to me for evaluation. Attached you will find relevant portions of my assessment and plan of care.    If you have questions, please do not hesitate to call me. I look forward to following Sheri Bates along with you.    Sincerely,      Amanda Bryan MD            CC  No Recipients    Enclosure

## 2025-02-12 RX ORDER — SOLIFENACIN SUCCINATE 5 MG/1
5 TABLET, FILM COATED ORAL
Qty: 90 TABLET | Refills: 3 | Status: SHIPPED | OUTPATIENT
Start: 2025-02-12

## 2025-02-13 ENCOUNTER — TELEPHONE (OUTPATIENT)
Dept: TRANSPLANT | Facility: CLINIC | Age: 80
End: 2025-02-13
Payer: MEDICARE

## 2025-02-27 ENCOUNTER — OFFICE VISIT (OUTPATIENT)
Dept: UROLOGY | Facility: CLINIC | Age: 80
End: 2025-02-27
Payer: MEDICARE

## 2025-02-27 VITALS
WEIGHT: 143 LBS | HEIGHT: 65 IN | SYSTOLIC BLOOD PRESSURE: 185 MMHG | DIASTOLIC BLOOD PRESSURE: 82 MMHG | BODY MASS INDEX: 23.82 KG/M2 | HEART RATE: 67 BPM

## 2025-02-27 DIAGNOSIS — N39.41 URGE URINARY INCONTINENCE: Primary | ICD-10-CM

## 2025-02-27 PROCEDURE — 99214 OFFICE O/P EST MOD 30 MIN: CPT | Mod: S$PBB,,, | Performed by: UROLOGY

## 2025-02-27 NOTE — PROGRESS NOTES
Subjective:       Patient ID: Sheri Bates is a 80 y.o. female.    Chief Complaint: No chief complaint on file.      HPI:  80-year-old female with a history of urinary urge incontinence her postvoid today in clinic is 0 she has been on VESIcare for a couple years it works decently well but she still has accidents she is interested in trying something else    Past Medical History:   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    cryptogenic    Migraine headache     Need for prophylactic immunotherapy     Osteopenia     Skin cancer        Past Surgical Historical:   Past Surgical History:   Procedure Laterality Date    COLONOSCOPY      HYSTERECTOMY  1972    LIVER TRANSPLANT  12/05/1993    OOPHORECTOMY  1968    skin cancer removal Left     Left arm        Medications:   Medication List with Changes/Refills   Current Medications    AMLODIPINE (NORVASC) 5 MG TABLET    Take 5 mg by mouth once daily.    CHOLECALCIFEROL, VITAMIN D3, 125 MCG (5,000 UNIT) TAB    Vitamin D    DILTIAZEM (CARDIZEM) 90 MG TABLET    Take 90 mg by mouth 2 (two) times daily.    ELIQUIS 5 MG TAB    Take 5 mg by mouth 2 (two) times daily.    EZETIMIBE (ZETIA) 10 MG TABLET    Take 10 mg by mouth every evening.    FERROUS SULFATE (FEOSOL) 325 MG (65 MG IRON) TAB TABLET    Take by mouth.    GABAPENTIN (NEURONTIN) 300 MG CAPSULE    TAKE 1 CAPSULE BY MOUTH ONCE A DAY FOR NERVE PAIN    HYDROXYZINE HCL (ATARAX) 25 MG TABLET    Take 25 mg by mouth every 4 (four) hours as needed for Itching.    IRON-VITAMIN C 100-250 MG, ICAR-C, 100-250 MG TAB    Take 1 tablet by mouth once daily.    LEVOCETIRIZINE (XYZAL) 5 MG TABLET    Take 5 mg by mouth once daily.    LEVOTHYROXINE (SYNTHROID) 75 MCG TABLET    Take 1 tablet (75 mcg total) by mouth once daily.     LORAZEPAM (ATIVAN) 0.5 MG TABLET    Take 1 tablet (0.5 mg total) by mouth every 12 (twelve) hours as needed for Anxiety.    MAGNESIUM 200 MG TAB    magnesium    MONTELUKAST (SINGULAIR) 10 MG TABLET    TK 1 T PO  QD    MULTIVITAMIN CAPSULE    Take 1 capsule by mouth once daily.    NIACIN 250 MG TAB    Take 250 mg by mouth nightly.    PANTOPRAZOLE (PROTONIX) 20 MG TABLET    Take 1 tablet (20 mg total) by mouth once daily.    POTASSIUM CHLORIDE SA (K-DUR,KLOR-CON M) 10 MEQ TABLET    Take 10 mEq by mouth.    PROPRANOLOL (INDERAL LA) 120 MG 24 HR CAPSULE    120 mg once daily.    SIROLIMUS (RAPAMUNE) 1 MG TAB    Take 1 tablet (1 mg total) by mouth once daily.    SOLIFENACIN (VESICARE) 5 MG TABLET    TAKE 1 TABLET BY MOUTH EVERY DAY    TRIAMCINOLONE ACETONIDE 0.1% (KENALOG) 0.1 % CREAM    APPLY TO THE AFFECTED AREA(S) ONCE DAILY        Past Social History: Social History[1]    Allergies:   Review of patient's allergies indicates:   Allergen Reactions    Azithromycin Diarrhea and Nausea Only        Family History:   Family History   Problem Relation Name Age of Onset    Coronary artery disease Mother  73    Coronary artery disease Father  62    Stomach cancer Brother  82    Stomach cancer Daughter      Stomach cancer Son      Colon cancer Neg Hx      Throat cancer Neg Hx      Liver disease Neg Hx      Ulcerative colitis Neg Hx      Crohn's disease Neg Hx      Pancreatic cancer Neg Hx      Esophageal cancer Neg Hx          Review of Systems:  Review of Systems   Constitutional:  Negative for activity change and appetite change.   HENT:  Negative for congestion and dental problem.    Respiratory:  Negative for chest tightness and shortness of breath.    Cardiovascular:  Negative for chest pain.   Gastrointestinal:  Negative for abdominal distention and abdominal pain.   Genitourinary:  Negative for decreased urine volume, difficulty urinating, dyspareunia, dysuria, enuresis, flank pain, frequency, genital sores, hematuria,  pelvic pain and urgency.   Musculoskeletal:  Negative for back pain and neck pain.   Allergic/Immunologic: Negative for immunocompromised state.   Neurological:  Negative for dizziness.   Hematological:  Negative for adenopathy.   Psychiatric/Behavioral:  Negative for agitation, behavioral problems and confusion.        Physical Exam:  Physical Exam  Constitutional:       Appearance: She is well-developed.   HENT:      Head: Normocephalic and atraumatic.      Right Ear: External ear normal.      Left Ear: External ear normal.   Eyes:      Conjunctiva/sclera: Conjunctivae normal.   Neck:      Vascular: No JVD.   Cardiovascular:      Rate and Rhythm: Normal rate and regular rhythm.   Pulmonary:      Effort: Pulmonary effort is normal. No respiratory distress.      Breath sounds: Normal breath sounds. No wheezing.   Abdominal:      General: There is no distension.      Palpations: Abdomen is soft.      Tenderness: There is no abdominal tenderness. There is no rebound.   Musculoskeletal:         General: Normal range of motion.      Cervical back: Normal range of motion.   Skin:     General: Skin is warm and dry.      Findings: No erythema or rash.   Neurological:      Mental Status: She is alert and oriented to person, place, and time.   Psychiatric:         Behavior: Behavior normal.         Assessment/Plan:       Problem List Items Addressed This Visit    None  Visit Diagnoses         Urge urinary incontinence    -  Primary               80-year-old female with urinary urgency and urge incontinence we will give her some samples of Gemtesa to try if this works we will send a prescription in.  Return to clinic in 1 year         [1]   Social History  Socioeconomic History    Marital status:    Tobacco Use    Smoking status: Never    Smokeless tobacco: Never   Substance and Sexual Activity    Alcohol use: No    Drug use: Never

## 2025-03-12 NOTE — TELEPHONE ENCOUNTER
----- Message from Taryn sent at 3/12/2025  3:43 PM CDT -----  Type: General Call Back Name of Caller:PtWould the patient rather a call back or a response via [facility]? callBest Call Back Number [phone] Information: Pt called  requesting a prescription that was given as sample and requesting a call back from Dr. Rivera Please contact pt with further information  ----- Message -----  From: Taryn Pereira  Sent: 3/12/2025   3:45 PM CDT  To: Miguel Parra    Type: General Call Back Name of Caller:PtWould the patient rather a call back or a response via [facility]? [place] [facility] Call Back Number [phone] Information: Pt called  requesting a prescription that was given as sample and requesting a call back from Dr. Rivera Please contact pt with further information

## 2025-03-13 DIAGNOSIS — N39.41 URGE URINARY INCONTINENCE: Primary | ICD-10-CM

## 2025-03-19 LAB
EXT ALBUMIN: 4.3
EXT ALKALINE PHOSPHATASE: 76
EXT ALT: 19
EXT AST: 34
EXT BILIRUBIN TOTAL: 0.7
EXT BUN: 20
EXT CALCIUM: 9.5
EXT CHLORIDE: 104
EXT CO2: 28
EXT CREATININE: 1.1 MG/DL
EXT EOSINOPHIL%: 2
EXT GFR MDRD NON AF AMER: 51
EXT GLUCOSE: 101
EXT HEMATOCRIT: 33
EXT HEMOGLOBIN: 10.6
EXT LYMPH%: 37.5
EXT MONOCYTES%: 10.4
EXT PLATELETS: 262
EXT POTASSIUM: 3.7
EXT PROTEIN TOTAL: 7.8
EXT SEGS%: 49
EXT SIROLIMUS LVL: 3.5
EXT SODIUM: 140 MMOL/L
EXT WBC: 4.6

## 2025-03-19 RX ORDER — OXYBUTYNIN CHLORIDE 15 MG/1
15 TABLET, EXTENDED RELEASE ORAL DAILY
Qty: 30 TABLET | Refills: 11 | Status: SHIPPED | OUTPATIENT
Start: 2025-03-19

## 2025-03-19 NOTE — TELEPHONE ENCOUNTER
Pt was not approved for Gemtessa with insurance, post giving pt 2 weeks of samples and her stating it worked great for her. I let her know we can attempt a PA for this medication, and in the meantime for her symptoms we can send out a medication that is approved. Ditrapan 15 daily for 1 month sending to [facility] in  [place]. Pt voiced understanding.

## 2025-03-21 ENCOUNTER — RESULTS FOLLOW-UP (OUTPATIENT)
Dept: HEPATOLOGY | Facility: HOSPITAL | Age: 80
End: 2025-03-21
Payer: MEDICARE

## 2025-03-24 ENCOUNTER — PATIENT MESSAGE (OUTPATIENT)
Dept: TRANSPLANT | Facility: CLINIC | Age: 80
End: 2025-03-24
Payer: MEDICARE

## 2025-03-24 NOTE — TELEPHONE ENCOUNTER
----- Message from Shantal Daugherty MD sent at 3/21/2025  4:18 PM CDT -----  Labs ok. No changes needed   ----- Message -----  From: Porsche Corcoran  Sent: 3/19/2025  11:46 AM CDT  To: Shantal Daugherty MD

## 2025-03-24 NOTE — TELEPHONE ENCOUNTER
Patient notified and instructed via [facility]:    Your labs have been reviewed by ; results were ok. No medicine changes were made. Repeat labs due 5/5/25. Thanks.

## 2025-04-01 ENCOUNTER — TELEPHONE (OUTPATIENT)
Dept: UROLOGY | Facility: CLINIC | Age: 80
End: 2025-04-01
Payer: MEDICARE

## 2025-04-01 NOTE — TELEPHONE ENCOUNTER
Spoke to both insurance company & pt and the Jose C is approved for $12.15 for #30 day supply. Pt voiced understanding.       ----- Message from Molly sent at 4/1/2025  1:52 PM CDT -----  CC Call Back Name Of Caller: Sheri SCHUSTER   Provider Name: Dr Rivera  Contact Preference?: [phone]  What is the nature of the call?: Pt states she would like a call back from someone in the office dur to her insurance company approving a medication the provider was trying to get the pt on. Pt states to please give her a call back with further assistance.

## 2025-04-03 ENCOUNTER — TELEPHONE (OUTPATIENT)
Dept: UROLOGY | Facility: CLINIC | Age: 80
End: 2025-04-03
Payer: MEDICARE

## 2025-04-03 DIAGNOSIS — N39.41 URGE URINARY INCONTINENCE: Primary | ICD-10-CM

## 2025-04-03 NOTE — TELEPHONE ENCOUNTER
Note in chart states that insurance did approve the gemtesa but script not sent.  Entered and routed to OCTAVIA العلي NP  Called and notified patient----- Message from Nury sent at 4/3/2025  1:14 PM CDT -----  Regarding: resend dariela  Contact: pt  Pt calling for status of gemtesa because she called pharmacy they don't have script yet.  Please resent and she can be reached at [phone] [facility]/pharmacy #[id number] - [place], [place] - [address] AT [facility] [place] [address]Rjznh: [phone] Fax: [phone]

## 2025-05-13 ENCOUNTER — TELEPHONE (OUTPATIENT)
Dept: TRANSPLANT | Facility: CLINIC | Age: 80
End: 2025-05-13
Payer: MEDICARE

## 2025-05-13 NOTE — TELEPHONE ENCOUNTER
----- Message from Med Assistant Cota sent at 5/13/2025  2:01 PM CDT -----  Regarding: FW: Consult/Advisory  Contact: Sheri Bates  She went to the lab on Monday, 5-12-25.  Rapamune is pending.  Not sure when she will need to have labs drawn again.  And she wants to go to a different facility.  Chart is in your hanging file.Thank you.  ----- Message -----  From: Sharita Saunders  Sent: 5/13/2025  12:50 PM CDT  To: [facility]Liver Transplant Non-Clinical  Subject: Consult/Advisory                                  Consult/Advisory Name Of Caller:Sheri Bates   Contact Preference:[phone] (home)   Nature of call:Patient is calling to get schedule for labs and new orders at [facility] fax#[phone]. Requesting a call back

## 2025-05-13 NOTE — TELEPHONE ENCOUNTER
"Returned patient call; she reported that she had labs drawn yesterday.  She is asking for new lab order to be sent to her lab with new doctor's name on it "since  left".  "

## 2025-06-04 LAB
EXT ALBUMIN: 4
EXT ALKALINE PHOSPHATASE: 70
EXT ALT: 14
EXT AST: 26
EXT BILIRUBIN TOTAL: 0.5
EXT BUN: 26
EXT CALCIUM: 9.4
EXT CHLORIDE: 104
EXT CO2: 27
EXT CREATININE: 1.3 MG/DL
EXT EOSINOPHIL%: 1.9
EXT GFR MDRD NON AF AMER: 42
EXT GLUCOSE: 104
EXT HEMATOCRIT: 32
EXT HEMOGLOBIN: 10.2
EXT LYMPH%: 38.1
EXT MONOCYTES%: 10.4
EXT PLATELETS: 240
EXT POTASSIUM: 4.2
EXT PROTEIN TOTAL: 7.5
EXT SEGS%: 48.9
EXT SIROLIMUS LVL: 3.1
EXT SODIUM: 139 MMOL/L
EXT WBC: 4.25

## 2025-06-05 ENCOUNTER — RESULTS FOLLOW-UP (OUTPATIENT)
Dept: TRANSPLANT | Facility: CLINIC | Age: 80
End: 2025-06-05
Payer: MEDICARE

## 2025-08-15 ENCOUNTER — RESULTS FOLLOW-UP (OUTPATIENT)
Dept: TRANSPLANT | Facility: CLINIC | Age: 80
End: 2025-08-15
Payer: MEDICARE

## 2025-08-15 LAB
EXT ALBUMIN: 4
EXT ALKALINE PHOSPHATASE: 69
EXT ALT: 16
EXT AST: 27
EXT BILIRUBIN TOTAL: 0.5
EXT BUN: 21
EXT CALCIUM: 9.5
EXT CHLORIDE: 105
EXT CHOLESTEROL: 177
EXT CO2: 30
EXT CREATININE: 1.4 MG/DL
EXT EOSINOPHIL%: 2
EXT GFR MDRD NON AF AMER: 38
EXT GLUCOSE: 101
EXT HDL: 39
EXT HEMATOCRIT: 32.9
EXT HEMOGLOBIN: 10.6
EXT LDL CHOLESTEROL: 109
EXT LYMPH%: 38.9
EXT MONOCYTES%: 12
EXT PLATELETS: 242
EXT POTASSIUM: 4.1
EXT PROTEIN TOTAL: 7.6
EXT SEGS%: 46.1
EXT SIROLIMUS LVL: 5.4
EXT SODIUM: 140 MMOL/L
EXT TRIGLYCERIDES: 146
EXT WBC: 4.9